# Patient Record
Sex: FEMALE | Race: WHITE | NOT HISPANIC OR LATINO | Employment: FULL TIME | URBAN - METROPOLITAN AREA
[De-identification: names, ages, dates, MRNs, and addresses within clinical notes are randomized per-mention and may not be internally consistent; named-entity substitution may affect disease eponyms.]

---

## 2021-01-18 ENCOUNTER — TELEPHONE (OUTPATIENT)
Dept: BEHAVIORAL HEALTH | Facility: CLINIC | Age: 56
End: 2021-01-18

## 2021-01-18 NOTE — TELEPHONE ENCOUNTER
Patient called intake to schedule dual eval for OP group- Referral created, pt has commercial [employer] plan through employment- info sent to ins team to verify- appt scheduled via [facility] visit with Hansa Girard for 1.20.2021 @ 1030am- [employer] activation sent to patient at time of scheduling

## 2021-01-20 ENCOUNTER — HOSPITAL ENCOUNTER (OUTPATIENT)
Dept: BEHAVIORAL HEALTH | Facility: CLINIC | Age: 56
Discharge: HOME | End: 2021-01-20
Attending: FAMILY MEDICINE | Admitting: FAMILY MEDICINE
Payer: COMMERCIAL

## 2021-01-20 PROCEDURE — H0001 ALCOHOL AND/OR DRUG ASSESS: HCPCS | Mod: 95 | Performed by: COUNSELOR

## 2021-01-20 RX ORDER — HYDROXYZINE HYDROCHLORIDE 25 MG/1
TABLET, FILM COATED ORAL
COMMUNITY

## 2021-01-20 RX ORDER — LACTOBACILLUS RHAMNOSUS GG 15B CELL
CAPSULE, SPRINKLE ORAL
COMMUNITY
Start: 2020-09-14

## 2021-01-20 RX ORDER — OMEPRAZOLE 40 MG/1
CAPSULE, DELAYED RELEASE ORAL
COMMUNITY
Start: 2020-06-04

## 2021-01-20 RX ORDER — ESCITALOPRAM OXALATE 10 MG/1
10 TABLET ORAL DAILY
COMMUNITY
Start: 2020-12-21

## 2021-01-20 RX ORDER — AMITRIPTYLINE HYDROCHLORIDE 75 MG/1
75 TABLET ORAL AT BEDTIME
COMMUNITY
Start: 2020-12-21

## 2021-01-20 ASSESSMENT — COLUMBIA-SUICIDE SEVERITY RATING SCALE - C-SSRS
TOTAL  NUMBER OF ABORTED OR SELF INTERRUPTED ATTEMPTS PAST LIFETIME: NO
6. HAVE YOU EVER DONE ANYTHING, STARTED TO DO ANYTHING, OR PREPARED TO DO ANYTHING TO END YOUR LIFE?: NO
6. HAVE YOU EVER DONE ANYTHING, STARTED TO DO ANYTHING, OR PREPARED TO DO ANYTHING TO END YOUR LIFE?: NO
1. IN THE PAST MONTH, HAVE YOU WISHED YOU WERE DEAD OR WISHED YOU COULD GO TO SLEEP AND NOT WAKE UP?: NO
1. IN THE PAST MONTH, HAVE YOU WISHED YOU WERE DEAD OR WISHED YOU COULD GO TO SLEEP AND NOT WAKE UP?: NO
ATTEMPT PAST THREE MONTHS: NO
TOTAL  NUMBER OF INTERRUPTED ATTEMPTS LIFETIME: NO
ATTEMPT LIFETIME: NO
TOTAL  NUMBER OF ABORTED OR SELF INTERRUPTED ATTEMPTS PAST 3 MONTHS: NO
TOTAL  NUMBER OF INTERRUPTED ATTEMPTS PAST 3 MONTHS: NO

## 2021-01-20 NOTE — PATIENT INSTRUCTIONS
Lily,   It was a pleasure meeting with you today. The next step is to contact MIRI Crockett at [phone] to request scheduling a start date with him. Again the program is virtual M,T,W,Th from 5:30-7:30pm. It is primarily group based. If you have any other questions please feel free to ask Asif or you can call me.    Hansa Echols [facility], [facility]  Licensed Psychotherapist [facility] [email or web address]  Office: [phone] Fax: [phone]  Gender pronouns: she/her/hers
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
## 2021-01-20 NOTE — TELEPHONE ENCOUNTER
----- Message from IMRI Richardson sent at 1/20/2021 12:37 PM CST -----  Regarding: Scheduling  PLEASE SCHEDULE: GERSON MUHAMMAD [[id number]]  FOR SERVICE INITIATION APPOINTMENT    IN [place] On:1//25/21  Visit Type : Video  GROUP THERAPY VIDEO VISIT [[id number]]  with MIRI Richardson @  315PM      PLEASE SCHEDULE:GERSON MUHAMMAD [[id number]]  For 24 sessions  START DATE:1/25/21  MIRI Richardson  Phase 1 Mixed in [place]  [id number] ON MON, WED, THUR 5:30 PM to 7:30 PM and TUES 5:30 PM to 8:30 PM  Visit Type : Video  GROUP THERAPY VIDEO VISIT [[id number]]    ThanksAsif

## 2021-01-20 NOTE — TELEPHONE ENCOUNTER
"SELINA  Name:   Lily Caldwell   YOB: 1965 Age:  55 year old Gender:  female   Referral Source: Employer   Referral DEEPAK: No   Insurance: The [facility] Department is responsible for obtaining ALL authorizations for treatment services at the EOP, DOP and LP levels of care.    Financial Screening: Financial approval from [place]'s business office is NOT NEEDED.     Precipitating Event: Treatment due to pressure from employer and Treatment to maintain employment/school enrollment     DOC: Marijuana    Additional abused substances: None     Medical: headaches     Mental Health: Anxiety     Prior Detox admissions: 1 prior IP detoxification admission(s).    Prior CD treatments: 1 prior CD treatment(s).     Psychosocial history:    and Single, in no serious relationship    2 adult child(josiah)    Stable housing and no concerns    Minimal support network, Relationship conflict with family members or friends due to substance abuse, History of legal issues, Currently employed, At risk of losing job and Financial problems     [place] Suicide Risk Status:  Past month: 0. - Very Low Risk:  Evaluation Counselors:  Document in Epic / [employer] to counselor \"Very Low Risk\".      Treatment Counselors:  Reassess upon admission as applicable, assess weekly in progress notes under Dimension 3 and summarize in Discharge / Treatment summary under Dimension 3.    Past 24 hours: 0. - Very Low Risk:  Evaluation Counselors:  Document in Epic / [employer] to counselor \"Very Low Risk\".      Treatment Counselors:  Reassess upon admission as applicable, assess weekly in progress notes under Dimension 3 and summarize in Discharge / Treatment summary under Dimension 3.     Additional Info as needed: There was no additional information to provide at this time.       "

## 2021-01-25 ENCOUNTER — HOSPITAL ENCOUNTER (OUTPATIENT)
Dept: BEHAVIORAL HEALTH | Facility: CLINIC | Age: 56
End: 2021-01-25
Attending: FAMILY MEDICINE
Payer: COMMERCIAL

## 2021-01-25 PROCEDURE — H2035 A/D TX PROGRAM, PER HOUR: HCPCS | Mod: 95

## 2021-01-25 PROCEDURE — H2035 A/D TX PROGRAM, PER HOUR: HCPCS | Mod: HQ,GT

## 2021-01-25 NOTE — PROGRESS NOTES
Patient:  Lily Caldwell    Date: January 25, 2021    Comprehensive Assessment UPDATE        Comprehensive Summary Update and Review  Counselor met with patient on 1/25/2021 and reviewed the Comprehensive Assessment.    The following updates have been made: Last use date changed from 1/20/21 to 1.3.21.        [place] Suicide Severity Rating Reassessment    Have you ever wished you were dead or that you could go to sleep and not wake up? Past Month?  NO       Have you actually had any thoughts of killing yourself?   Past Month? NO    Have you been thinking about how you might do this?   Past Month?  N/A  Lifetime?   N/A     Have you had these thoughts and had some intention of acting on them?   Past Month?  N/A  Lifetime?   N/A     Have you started to work out the details of how to kill yourself?  Past Month?  N/A  Lifetime?   N/A     Do you intend to carry out this plan?   N/A     When you have the thoughts how long do they last?   N/A    Are there things - anyone or anything (ie Family, Anglican, pain of death) that stopped you from wanting to die or acting on thoughts of suicide?   Does not apply        2008  [employer]  Used with permission by Ceci Robbins, PhD.

## 2021-01-25 NOTE — PROGRESS NOTES
Initial Services Plan        Service Initiation Date: 1/25/2021    Immediate health and/or safety concerns: Yes    Identify health and safety concern(s) below and include plan to address:    Other: Clt reports headaches     Treatment suggestions for client during the time between intake (admit date) and completion of the individual treatment plan:     Introduce yourself to your treatment group. Spend time getting to know your peers    Completed by: MIRI Richardson  Date completed: 1/25/2021 at 3:18 PM

## 2021-01-25 NOTE — GROUP NOTE
"Group Therapy Documentation    PATIENT'S NAME: Lily aCldwell  MRN:   [id number]  :   1965  ACCT. NUMBER: [id number]  DATE OF SERVICE: 21  START TIME:  5:30 PM  END TIME:  7:30 PM  FACILITATOR(S): Chase Wells LADC  TOPIC: BEH Group Therapy  Number of patients attending the group: 11  Group Length:  2 Hours    Group Therapy Type: Addiction    Summary of Group / Topics Discussed:    Choices in Recovery    Telemedicine Visit: The patient's condition can be safely assessed and treated via synchronous audio and visual telemedicine encounter.      Reason for Telemedicine Visit: Services only offered telehealth    Originating Site (Patient Location): Patient's home    Distant Site (Provider Location): Provider Remote Setting    Consent:  The patient/guardian has verbally consented to: the potential risks and benefits of telemedicine (video visit) versus in person care; bill my insurance or make self-payment for services provided; and responsibility for payment of non-covered services.     Mode of Communication:  Video Conference via [employer]    As the provider I attest to compliance with applicable laws and regulations related to telemedicine.       Group Attendance:  Attended group session    Patient's response to the group topic/interactions:  cooperative with task    Patient appeared to be Attentive.        Client specific details:  Client participated with the check-in process and reported no change in sobriety date.  This was Clt's first group session. She introduced herself to group peers. After a reading that talked about \"hate\" she shared her experience with things like hate that just rent space in our heads and keep us focused on negativity.. . This relates to clients Dim 5 treatment plan goal of Develop sober coping and living skills in order to address the development of a strategy for long term recovery   "

## 2021-01-26 ENCOUNTER — HOSPITAL ENCOUNTER (OUTPATIENT)
Dept: BEHAVIORAL HEALTH | Facility: CLINIC | Age: 56
End: 2021-01-26
Attending: FAMILY MEDICINE
Payer: COMMERCIAL

## 2021-01-26 PROCEDURE — H2035 A/D TX PROGRAM, PER HOUR: HCPCS | Mod: HQ,95 | Performed by: SOCIAL WORKER

## 2021-01-27 ENCOUNTER — HOSPITAL ENCOUNTER (OUTPATIENT)
Dept: BEHAVIORAL HEALTH | Facility: CLINIC | Age: 56
End: 2021-01-27
Attending: FAMILY MEDICINE
Payer: COMMERCIAL

## 2021-01-27 PROCEDURE — H2035 A/D TX PROGRAM, PER HOUR: HCPCS | Mod: HQ,95

## 2021-01-27 NOTE — GROUP NOTE
Group Therapy Documentation    PATIENT'S NAME: Lily Caldwell  MRN:   [id number]  :   1965  [id number]  DATE OF SERVICE: 21  START TIME:  5:30 PM  END TIME:  8:30 PM  FACILITATOR(S): Chloe Kuhn LICSW  TOPIC: BEH Group Therapy  Number of patients attending the group:  10  Group Length:  3 Hours    Group Therapy Type: Addiction    Summary of Group / Topics Discussed:    Psychoeducation/Skills Values, Connection and Committee Action (IOP)  Clients learn key concepts of traditional CBT and build on these by learning three core concepts of third wave therapies (Values, Committed Action, and Self-as-Context) and how to apply these in recovery.     Objective(s):    Identify 2 reasons it is important to re-identify and prioritize values that guide behaviors.    Identify 2 ways that the escalation of  addiction may change previous values-based behaviors in negative ways    Identify 3 positive impacts of making connections socially, environmentally, and within the community     Structure (modalities, homework, worksheets, etc.):    Group activity-mapping psychological flexibility     Excerpts from documentary on importance of connections    Group activity-brainstorming universal and personal values     Values worksheet with Value, Goal, Step to take this week    Expected therapeutic outcome(s):     Reinstatement of values that may have been overlooked in active addiction    Reduction of guilt and remorse     Increased satisfaction in relationships and connections    Improved mood and sense of belonging     Therapeutic outcome(s) measured by:   Teachback and Group Review and Evaluation form.    Telemedicine Visit: The patient's condition can be safely assessed and treated via synchronous audio and visual telemedicine encounter.      Reason for Telemedicine Visit: Services only offered telehealth     Originating Site (Patient Location): Patient's home     Distant Site (Provider Location):  "Provider Remote Setting     Consent:  The patient/guardian has verbally consented to: the potential risks and benefits of telemedicine (video visit) versus in person care; bill my insurance or make self-payment for services provided; and responsibility for payment of non-covered services.      Mode of Communication:  Video Conference via [employer]     As the provider I attest to compliance with applicable laws and regulations related to telemedicine.     Group Attendance:  Attended group session    Patient's response to the group topic/interactions:  discussed personal experience with topic, expressed readiness to alter behaviors and expressed understanding of topic    Patient appeared to be Attentive and Engaged.        Client specific details:  This was Lily's first session with this therapist and she shared with group what brought her to treatment. Her drug of choice is \"Meth, but I've not used for 25 years\" but the reason she is in treatment is due to her using marijuana which she agrees I a problem in her life. Her last use date is 1/03/21. Mindfulness: she is unfamiliar with concept but uses deep breathing to calm down; Stress: \"none right now\"; Cravings: \"haven't had any, maybe one in the last week.\" The value to work on this week is \"Non-judgemental\" and the said her daughter-in-law has a 6-month old and a 3 year old and leaves the house dirty. Her step this week is to \"Ask her what I can do to help her gt things done.\" This topic completes one of the six required MH Skills Groups required in D3 of her Treatment Plan.     "

## 2021-01-27 NOTE — GROUP NOTE
"Group Therapy Documentation    PATIENT'S NAME: Lily Caldwell  MRN:   [id number]  :   1965  ACCT. NUMBER: [id number]  DATE OF SERVICE: 21  START TIME:  5:30 PM  END TIME:  7:30 PM  FACILITATOR(S): Chase Wells LADC  TOPIC: BEH Group Therapy  Number of patients attending the group:  10  Group Length:  2 Hours    Group Therapy Type: Addiction    Summary of Group / Topics Discussed:    Choices in Recovery    Group had AA speaker Luis CRAFT Provide overview/introdiction to AA and how it can be helpful for people seeking recovery  Group watched a portion Of video \"Jorge Alberto To Self\" that out lines structure needed for success in recovery    Telemedicine Visit: The patient's condition can be safely assessed and treated via synchronous audio and visual telemedicine encounter.      Reason for Telemedicine Visit: Services only offered telehealth    Originating Site (Patient Location): Patient's home    Distant Site (Provider Location): Provider Remote Setting    Consent:  The patient/guardian has verbally consented to: the potential risks and benefits of telemedicine (video visit) versus in person care; bill my insurance or make self-payment for services provided; and responsibility for payment of non-covered services.     Mode of Communication:  Video Conference via [employer]    As the provider I attest to compliance with applicable laws and regulations related to telemedicine.       Group Attendance:  Attended group session    Patient's response to the group topic/interactions:  cooperative with task    Patient appeared to be Engaged.        Client specific details:  Client reported no change in sobriety date.  Client expressed feeling \"lost\" trying to get into a rhythm with the zoom video. She asked the speaker recovery questions dealing with her inability to stay sober. She shared that she dug out her old BB and plans to start reading it.   A portion of the video  Jorge Alberto to Self:  Protecting Sobriety with the " Science of Safety  was presented.  This session relates to client s Dimension 5 goal to develop sober coping and living skills in order to address the development of a strategy for long term recovery.  .

## 2021-01-28 ENCOUNTER — HOSPITAL ENCOUNTER (OUTPATIENT)
Dept: BEHAVIORAL HEALTH | Facility: CLINIC | Age: 56
End: 2021-01-28
Attending: FAMILY MEDICINE
Payer: COMMERCIAL

## 2021-01-28 PROCEDURE — H2035 A/D TX PROGRAM, PER HOUR: HCPCS | Mod: HQ,GT

## 2021-01-28 NOTE — TELEPHONE ENCOUNTER
----- Message from MIRI Richardson sent at 1/27/2021  5:24 PM CST -----  Regarding: schedule 1:1  Please schedule: GERSON MUHAMMAD [[id number]]  For 1:1 with Asif Wells in [place] for date: 2/2/21 at 200pm  Duration 1 hour  Visit Type:  VIDEO VISIT  Asif Corea

## 2021-01-28 NOTE — GROUP NOTE
Group Therapy Documentation    PATIENT'S NAME: Lily Caldwell  MRN:   [id number]  :   1965  ACCT. NUMBER: [id number]  DATE OF SERVICE: 21  START TIME:  5:30 PM  END TIME:  7:30 PM  FACILITATOR(S): Chase Wells LADC  TOPIC: BEH Group Therapy  Number of patients attending the group:  12  Group Length:  2 Hours    Group Therapy Type: Life skill(s)    Summary of Group / Topics Discussed:    Coping Skills/Lifestyle Managemet    Telemedicine Visit: The patient's condition can be safely assessed and treated via synchronous audio and visual telemedicine encounter.      Reason for Telemedicine Visit: Services only offered telehealth    Originating Site (Patient Location): Patient's home    Distant Site (Provider Location): Provider Remote Setting    Consent:  The patient/guardian has verbally consented to: the potential risks and benefits of telemedicine (video visit) versus in person care; bill my insurance or make self-payment for services provided; and responsibility for payment of non-covered services.     Mode of Communication:  Video Conference via [employer]    As the provider I attest to compliance with applicable laws and regulations related to telemedicine.        Group Attendance:  Attended group session    Patient's response to the group topic/interactions:  cooperative with task    Patient appeared to be Attentive.        Client specific details:  Clt said she wanted to connect with peers over the weekend for support. She said she was feeling frustrated about zoom. She shared a personal strength of being honest..Client specific details:  Client reported no change in sobriety date.   A portion of the video  Jorge Alberto to Self:  Protecting Sobriety with the Science of Safety  was presented.  This session relates to client s Dimension 5 goal to develop sober coping and living skills in order to address the development of a strategy for long term recovery.

## 2021-02-02 ENCOUNTER — HOSPITAL ENCOUNTER (OUTPATIENT)
Dept: BEHAVIORAL HEALTH | Facility: CLINIC | Age: 56
End: 2021-02-02
Attending: FAMILY MEDICINE
Payer: COMMERCIAL

## 2021-02-02 PROCEDURE — H2035 A/D TX PROGRAM, PER HOUR: HCPCS | Mod: HQ,GT | Performed by: SOCIAL WORKER

## 2021-02-03 ENCOUNTER — HOSPITAL ENCOUNTER (OUTPATIENT)
Dept: BEHAVIORAL HEALTH | Facility: CLINIC | Age: 56
End: 2021-02-03
Attending: FAMILY MEDICINE
Payer: COMMERCIAL

## 2021-02-03 PROCEDURE — H2035 A/D TX PROGRAM, PER HOUR: HCPCS | Mod: HQ,GT

## 2021-02-03 NOTE — GROUP NOTE
Group Therapy Documentation    PATIENT'S NAME: Lily Caldwell  MRN:   [id number]  :   1965  ACCT. NUMBER: [id number]  DATE OF SERVICE: 21  START TIME:  5:30 PM  END TIME:  8:30 PM  FACILITATOR(S): Chloe Kuhn LICSW  TOPIC: BEH Group Therapy  Number of patients attending the group:  11  Group Length:  3 Hours    Group Therapy Type: Addiction    Summary of Group / Topics Discussed:    Psychoeducation/Skills Self-Care and Humor in Recovery (IOP)  Learn how powerful self-care is in healing from addiction and building resiliency in mental and physical health.  Learn many aspects of self-care linked to elevating moods, increasing energy reserve and staminal, thinking clearer, focusing better on tasks and improving organizational skills.     Objective(s):    Name 3 reasons self-care is crucial for optimal health and recovery from illness.      Identify 2 mental and physical illnesses directly linked to poor self-care.    Identify 3 ways social connections build resilience and may strengthen our immune systems.    Create a personal plan of action to add to daily schedule of structure and routine.     Structure (modalities, homework, worksheets, etc.):    Self-Care quiz (worksheet)    Education on Self-Care with emphasis on Recovery (PowerPoint)    Why Self-care is a necessity, not a luxury (discussion/white board)    Worksheet to explore and prioritize needs    Action plan to start this week    Expected therapeutic outcome(s):     Be more proactive in their mental and physical health     Start their personal self-care plan this week and note results in journal    Take an inventory of choices and behaviors that hinder their health and have a negative effect on the quality of their lives.      Work to remove the obstacles that keep them from these aspects of self-care.     Therapeutic outcome(s) measured by:   Teachback, creation of personal action plan, and group review and evaluation    Telemedicine  "Visit: The patient's condition can be safely assessed and treated via synchronous audio and visual telemedicine encounter.      Reason for Telemedicine Visit: Services only offered telehealth     Originating Site (Patient Location): Patient's home     Distant Site (Provider Location): Provider Remote Setting     Consent:  The patient/guardian has verbally consented to: the potential risks and benefits of telemedicine (video visit) versus in person care; bill my insurance or make self-payment for services provided; and responsibility for payment of non-covered services.      Mode of Communication:  Video Conference via [employer]     As the provider I attest to compliance with applicable laws and regulations related to telemedicine.     Group Attendance:  Attended group session    Patient's response to the group topic/interactions:  discussed personal experience with topic, expressed readiness to alter behaviors and expressed understanding of topic    Patient appeared to be Actively participating.        Client specific details:  Lily shared that she is experiencing some \"short-term memory loss\" which may be due to PAWS. Her stress was at 10 earlier due to work issues but was down to 6-7 during group. She said this was due to all the changes in her life since. She just returned home from [place] yesterday and said she had no cravings on her trip which surprised her. Her self-care this week will be \"to cut my caffeine intake by going back to drinking water.\" This topic completes one of the six required MH Skills Groups required in D3 of her Treatment Plan.     "

## 2021-02-03 NOTE — GROUP NOTE
Group Therapy Documentation    PATIENT'S NAME: Lily Caldwell  MRN:   [id number]  :   1965  ACCT. NUMBER: [id number]  DATE OF SERVICE: 21  START TIME:  5:30 PM  END TIME:  7:30 PM  FACILITATOR(S): Chase Wells LADC  TOPIC: BEH Group Therapy  Number of patients attending the group:    Group Length:  2 Hours    Group Therapy Type: Addiction    Summary of Group / Topics Discussed:    Values Exploration    Telemedicine Visit: The patient's condition can be safely assessed and treated via synchronous audio and visual telemedicine encounter.      Reason for Telemedicine Visit: Services only offered telehealth    Originating Site (Patient Location): Patient's home    Distant Site (Provider Location): Provider Remote Setting    Consent:  The patient/guardian has verbally consented to: the potential risks and benefits of telemedicine (video visit) versus in person care; bill my insurance or make self-payment for services provided; and responsibility for payment of non-covered services.     Mode of Communication:  Video Conference via [employer]    As the provider I attest to compliance with applicable laws and regulations related to telemedicine.       Group Attendance:  Attended group session    Patient's response to the group topic/interactions:  cooperative with task    Patient appeared to be Engaged.        Client specific details:  Client reported no change in sobriety date.   CLt said she was feeling skeptical tonight. Her personal strength was  Being strong in tough times. She shared a core value of Honesty..This relates to clients Dim 4 treatment plan goal of understanding the impact your substance use has had on you, your family, and significant relationships.

## 2021-02-03 NOTE — PROGRESS NOTES
[facility] Weekly Treatment Plan Review      ATTENDANCE for the following date span:  Monday, 2/2/21thru Thursday, 2/4/21.    Date Monday Tuesday Wednesday Thursday    Group Therapy 0 hours 3 hours 2.0 hours 2.0 hours   Individual Therapy       Family Therapy       Other (Specify)           Patient did have any absences during this time period (list absence dates and reason for absence).  Out of town      Weekly Treatment Plan Review     Treatment Plan initiated on: 1/27/21    Dimension1: Acute Intoxication/Withdrawal Potential -   Date of Last Use 1/20/21  Any reports of withdrawal symptoms - No        Dimension 2: Biomedical Conditions & Complications -   Medical Concerns:  NA  Current Medications & Medication Changes:  Current Outpatient Medications   Medication     amitriptyline (ELAVIL) 75 MG tablet     escitalopram (LEXAPRO) 10 MG tablet     hydrOXYzine (ATARAX) 25 MG tablet     Lactobacillus Rhamnosus, GG, ([facility]) capsule     omeprazole (PRILOSEC) 40 MG DR capsule     No current facility-administered medications for this encounter.      Medication Prescriber:  PCP  Taking meds as prescribed? Yes  Medication side effects or concerns:  NA  Outside medical appointments this week (list provider and reason for visit):  NA        Dimension 3: Emotional/Behavioral Conditions & Complications -   Mental health diagnosis NA    Date of last SIB:  NA  Date of  last SI:  NA  Date of last HI: NA  Behavioral Targets:    Current MH Assignments:      Narrative:     Has impulse control and coping skills. Functions adequately in significant life areas.       Dimension 4: Treatment Acceptance / Resistance -   DARIEL Diagnosis:304.30 (F12.20) Cannabis Use Disorder Moderate   Stage - 1  Commitment to tx process/Stage of change- Preparation  DARIEL assignments -NA    Narrative -    Displays verbal compliance, but lacks consistent behaviors; has low motivation for change; is passively involved in Tx.        Dimension 5: Relapse / Continued Problem Potential -   Relapses this week - None  Urges to use - None  UA results -Clt is submitting UA weekly for work    Narrative-    Poor recognition and understanding of relapse and recidivism issues and displays moderately high vulnerability for further substance use . Has few coping skills, rarely applied.         Dimension 6: Recovery Environment -   Family Involvement - Patient's current living/housing situation involves staying in own home/apartment.  They live with my kids, son and daughter in law and their two children live with me and they report that housing is stable. Patient identified no one as part of their support system.    Summarize attendance at family groups and family sessions - NA  Family supportive of treatment?  Yes    Community support group attendance - NA  Recreational activities - None reported    Narrative -    Engaged in structured, meaningful activity,        Justification for Continued Treatment at this Level of Care:   Poor recognition and understanding of relapse and recidivism issues and displays moderately high vulnerability for further substance use . Has few coping skills, rarely applied.    Discharge Planning:  Target Discharge Date/Timeframe:  6/7/21   Med Mgmt Provider/Appt:  EMILY   Ind therapy Provider/Appt:  EMILY   Family therapy Provider/Appt:  EMILY           Dimension Scale Review     Prior ratings: Dim1 - 0 DIM2 - 1 DIM3 - 1 DIM4 - 2 DIM5 - 3 DIM6 -2     Current ratings: Dim1 - 0 DIM2 - 1 DIM3 - 1 DIM4 - 2 DIM5 - 3 DIM6 -2       Has vulnerable adult status change? No    Are Treatment Plan goals/objectives effective? Yes  *If no, list changes to treatment plan:    Are the current goals meeting client's needs? Yes  *If no, list the changes to treatment plan.    Client Input / Response: NA    Individual Session Start time:  NA  Individual Session Stop Time:  NA    *Client agrees with any changes to the treatment plan: N/A  *Client received  copy of changes: N/A  *Client is aware of right to access a treatment plan review: Yes

## 2021-02-04 ENCOUNTER — HOSPITAL ENCOUNTER (OUTPATIENT)
Dept: BEHAVIORAL HEALTH | Facility: CLINIC | Age: 56
End: 2021-02-04
Attending: FAMILY MEDICINE
Payer: COMMERCIAL

## 2021-02-04 PROCEDURE — H2035 A/D TX PROGRAM, PER HOUR: HCPCS | Mod: HQ,GT

## 2021-02-04 NOTE — GROUP NOTE
"Group Therapy Documentation    PATIENT'S NAME: Lily Caldwell  MRN:   [id number]  :   1965  ACCT. NUMBER: [id number]  DATE OF SERVICE: 21  START TIME:  5:30 PM  END TIME:  7:30 PM  FACILITATOR(S): Chase Wells LADC  TOPIC: BEH Group Therapy  Number of patients attending the group:  10  Group Length:  2 Hours    Group Therapy Type: Life skill(s)    Summary of Group / Topics Discussed:    Boundaries    Telemedicine Visit: The patient's condition can be safely assessed and treated via synchronous audio and visual telemedicine encounter.      Reason for Telemedicine Visit: Services only offered telehealth    Originating Site (Patient Location): Patient's home    Distant Site (Provider Location): Provider Remote Setting    Consent:  The patient/guardian has verbally consented to: the potential risks and benefits of telemedicine (video visit) versus in person care; bill my insurance or make self-payment for services provided; and responsibility for payment of non-covered services.     Mode of Communication:  Video Conference via [employer]    As the provider I attest to compliance with applicable laws and regulations related to telemedicine.       Group Attendance:  Attended group session    Patient's response to the group topic/interactions:  cooperative with task    Patient appeared to be Attentive.        Client specific details:  Client reviewed and evaluated their protective factors and how they might improve them. explored and identified personal boundaries needed to support their recovery. Clt shared that a protective factor for her has been \"Sense of Purpose\" from her job. Now that she is off work after testing positive and being monitored she really knows what she'll be facing if she were to lose her job permanently.  This related to Client Dim 3 goal: Learn how to apply self-care and psychotherapy to build a repertoire of daily habits that counteract PAWS, fatigue, depression and anxiety " leading to increased resiliency and well-being.

## 2021-02-08 ENCOUNTER — HOSPITAL ENCOUNTER (OUTPATIENT)
Dept: BEHAVIORAL HEALTH | Facility: CLINIC | Age: 56
End: 2021-02-08
Attending: FAMILY MEDICINE
Payer: COMMERCIAL

## 2021-02-08 PROCEDURE — H2035 A/D TX PROGRAM, PER HOUR: HCPCS | Mod: HQ,GT

## 2021-02-08 NOTE — PROGRESS NOTES
Comprehensive Assessment Summary     Based on client interview, review of previous assessments and   comprehensive assessment interview the following diagnosis and recommendations are:     Patient: Lily Caldwell  MRN; [id number]   : 1965  Age: 55 year old Sex: female       Client meets criteria for:  304.30 (F12.20) Cannabis Use Disorder Moderate     Dimension One: Acute Intoxication/Withdrawal Potential     Ratin  (Consider the client's ability to cope with withdrawal symptoms and current state of intoxication)      No signs or symptoms of intoxication or withdrawa      Dimension Two: Biomedical Condition and Complications    Ratin  (Consider the degree to which any physical disorder would interfere with treatment for substance abuse, and the client's ability to tolerate any related discomfort; determine the impact of continued chemical use on the unborn child if the client is pregnant)      Tolerates and alberto with physical discomfort and is able to get the services that s/he needs.     Dimension Three: Emotional/Behavioral/Cognitive Conditions & Complications  Ratin  (Determine the degree to which any condition or complications are likely to interfere with treatment for substance abuse or with functioning in significant life areas and the likelihood of risk of harm to self or others)   Has impulse control and coping skills. Functions adequately in significant life areas.    Dimension Four: Treatment Acceptance/Resistance     Ratin  (Consider the amount of support and encouragement necessary to keep the client involved in treatment)      Displays verbal compliance, but lacks consistent behaviors;      Dimension Five: Continued Use/Relaspe Prevention     Rating:  3  (Consider the degree to which the client's recognizes relapse issues and has the skills to prevent relapse of either substance use or mental health problems)    Poor recognition and understanding of relapse and recidivism  issues and displays moderately high vulnerability for further substance use . Has few coping skills, rarely applied. Reports not working while in tx.    Dimension Six: Recovery Environment     Ratin  (Consider the degree to which key areas of the client's life are supportive of or antagonistic to treatment participation and recovery)      Engaged in structured, meaningful activity. Reports Reports son and daughter in law and their two children live with her and they are not sober.           I have reviewed the information on the assessment, psychosocial and medical history and checklist:        it is current

## 2021-02-08 NOTE — GROUP NOTE
"Group Therapy Documentation    PATIENT'S NAME: Lily Caldwell  MRN:   [id number]  :   1965  ACCT. NUMBER: [id number]  DATE OF SERVICE: 21  START TIME:  5:30 PM  END TIME:  7:30 PM  FACILITATOR(S): Chase Wells LADC  TOPIC: BEH Group Therapy  Number of patients attending the group: 11  Group Length:  2 Hours    Group Therapy Type: Addiction    Summary of Group / Topics Discussed:    Complete Values and  Goal Setting  Client treatment plan assignment presentations    Telemedicine Visit: The patient's condition can be safely assessed and treated via synchronous audio and visual telemedicine encounter.      Reason for Telemedicine Visit: Services only offered telehealth    Originating Site (Patient Location): Patient's home    Distant Site (Provider Location): Provider Remote Setting    Consent:  The patient/guardian has verbally consented to: the potential risks and benefits of telemedicine (video visit) versus in person care; bill my insurance or make self-payment for services provided; and responsibility for payment of non-covered services.     Mode of Communication:  Video Conference via [employer]    As the provider I attest to compliance with applicable laws and regulations related to telemedicine.        Group Attendance:  Attended group session    Patient's response to the group topic/interactions:  cooperative with task    Patient appeared to be Engaged.        Client specific details:  Client reported no change in sobriety date.  Client expressed feeling \"hopeful \" today.  Reported Sober activities over the weekend of having found a sponsor who is willing to work with her     .  Client listened to peer share symptoms assignment an participate in group process of what drives self-harm and what it is as a powerfully addictive coping skill.  This related to Client Dim 3 goal: Learn how to apply self-care and psychotherapy to build a repertoire of daily habits that counteract PAWS, fatigue, " depression and anxiety leading to increased resiliency and well-being..

## 2021-02-09 ENCOUNTER — HOSPITAL ENCOUNTER (OUTPATIENT)
Dept: BEHAVIORAL HEALTH | Facility: CLINIC | Age: 56
End: 2021-02-09
Attending: FAMILY MEDICINE
Payer: COMMERCIAL

## 2021-02-09 PROCEDURE — H2035 A/D TX PROGRAM, PER HOUR: HCPCS | Mod: HQ,GT | Performed by: SOCIAL WORKER

## 2021-02-10 ENCOUNTER — HOSPITAL ENCOUNTER (OUTPATIENT)
Dept: BEHAVIORAL HEALTH | Facility: CLINIC | Age: 56
End: 2021-02-10
Attending: FAMILY MEDICINE
Payer: COMMERCIAL

## 2021-02-10 PROCEDURE — H2035 A/D TX PROGRAM, PER HOUR: HCPCS | Mod: HQ,GT

## 2021-02-10 NOTE — GROUP NOTE
"Group Therapy Documentation    PATIENT'S NAME: Lily Caldwell  MRN:   [id number]  :   1965  ACCT. NUMBER: [id number]  DATE OF SERVICE: 2/10/21  START TIME:  5:30 PM  END TIME:  7:30 PM  FACILITATOR(S): Chase Wells LADC  TOPIC: BEH Group Therapy  Number of patients attending the group:  12  Group Length:  2 Hours    Group Therapy Type: Addiction    Summary of Group / Topics Discussed:    Post Acute Withdrawal Syndrome    Telemedicine Visit: The patient's condition can be safely assessed and treated via synchronous audio and visual telemedicine encounter.      Reason for Telemedicine Visit: Services only offered telehealth    Originating Site (Patient Location): Patient's home    Distant Site (Provider Location): Provider Remote Setting    Consent:  The patient/guardian has verbally consented to: the potential risks and benefits of telemedicine (video visit) versus in person care; bill my insurance or make self-payment for services provided; and responsibility for payment of non-covered services.     Mode of Communication:  Video Conference via [employer]    As the provider I attest to compliance with applicable laws and regulations related to telemedicine.       Group Attendance:  Attended group session    Patient's response to the group topic/interactions:  cooperative with task    Patient appeared to be Engaged.        Client specific details:  Client participated in the check-in process and reported no change in sobriety date.  Client expressed feeling \" happy \" today. Clt stated anxiety/depression was  0/10  Client participated in guided mediation and responded to a meditative reading with his interpretation of what was meaningful.  Client participated in group discussion of developing goals in recover and shared a goal of  family relationships improving by talking softly and refraning from any yelling, this related to Client Dim 3 goal: Learn how to apply self-care and psychotherapy to build a " repertoire of daily habits that counteract PAWS, fatigue, depression and anxiety leading to increased resiliency and well-being..

## 2021-02-10 NOTE — GROUP NOTE
Group Therapy Documentation    PATIENT'S NAME: Lily Caldwell  MRN:   [id number]  :   1965  ACCT. NUMBER: [id number]  DATE OF SERVICE: 21  START TIME:  5:30 PM  END TIME:  8:30 PM  FACILITATOR(S): Chloe Kuhn LICSW  TOPIC: BEH Group Therapy  Number of patients attending the group:  13  Group Length:  3 Hours    Group Therapy Type: Addiction    Summary of Group / Topics Discussed:    Psychoeducation/Skills Stress Management and Addiction    Objective(s):    Identify 2 ways stress is connected to relapse in addiction    Identify 3 ways stress affects physical and mental health    Identify 3 ways to manage stress    Identify 1 healthy daily routine to commit to starting this week.     Structure (modalities, homework, worksheets, etc.):    Stress Management Handout   o Definition of Stress  o Importance of self-awareness to warning signs of stress  o Stress reduction in recovery  o Skills used to reduce stress and counteract its impact    Demonstration and practice of deep breathing exercise     Demonstration and practice of Mindfulness     Demonstration and practice of Progressive Relaxation exercise     Demonstration of Aromatherapy inhalers    Expected therapeutic outcome(s):     Decreased frequency and intensity of PAWS    Increased stamina and resiliency    Improved ability to function at optimal level     Therapeutic outcome(s) measured by:   Teachback and group review and evaluation  Telemedicine Visit: The patient's condition can be safely assessed and treated via synchronous audio and visual telemedicine encounter.      Reason for Telemedicine Visit: Services only offered telehealth     Originating Site (Patient Location): Patient's home     Distant Site (Provider Location): Provider Remote Setting     Consent:  The patient/guardian has verbally consented to: the potential risks and benefits of telemedicine (video visit) versus in person care; bill my insurance or make self-payment for  "services provided; and responsibility for payment of non-covered services.      Mode of Communication:  Video Conference via [employer]     As the provider I attest to compliance with applicable laws and regulations related to telemedicine.     Group Attendance:  Attended group session    Patient's response to the group topic/interactions:  expressed readiness to alter behaviors and expressed understanding of topic    Patient appeared to be Attentive and Engaged.        Client specific details:  Lily shared that a friend called her this week wanting her to smoke weed with them and she declined \"with no hesitation at all.\" She also got a sponsor and is writing in her journal. She said her stress was at 0 and she also had 0 cravings. The stress reduction skill she likes she described as \"I'm big on 'If it hasn't happened yet, don't stress over it.'\" She wants to use progressive relaxation this week and do more deep breathing. This topic completes one of the six required MH Skills Groups required in D3 of her Treatment Plan.     "

## 2021-02-11 ENCOUNTER — HOSPITAL ENCOUNTER (OUTPATIENT)
Dept: BEHAVIORAL HEALTH | Facility: CLINIC | Age: 56
End: 2021-02-11
Attending: FAMILY MEDICINE
Payer: COMMERCIAL

## 2021-02-11 PROCEDURE — H2035 A/D TX PROGRAM, PER HOUR: HCPCS | Mod: HQ,GT

## 2021-02-11 PROCEDURE — H2035 A/D TX PROGRAM, PER HOUR: HCPCS | Mod: GT

## 2021-02-11 NOTE — TELEPHONE ENCOUNTER
----- Message from MIRI Richardson sent at 2/11/2021 10:24 AM CST -----  Regarding: Schedule  Please schedule: GERSON MUHAMMAD [[id number]]  For 1:1 with Asif Wells in [place] for date:  2/11/21 400pm  Duration 1 hour  Visit Type: Asif Duncan

## 2021-02-11 NOTE — PROGRESS NOTES
"[facility] Weekly Treatment Plan Review      ATTENDANCE for the following date span:  Monday, 2/2/21thru Thursday, 2/4/21.    Date Monday Tuesday Wednesday Thursday    Group Therapy 0 hours 3 hours 2.0 hours 2.0 hours   Individual Therapy       Family Therapy       Other (Specify)           Patient did not have any absences during this time period (list absence dates and reason for absence).        Weekly Treatment Plan Review    Phase 1: 11 sessions attended through 2/11     Treatment Plan initiated on: 1/27/21    Dimension1: Acute Intoxication/Withdrawal Potential -   Date of Last Use 1/20/21  Any reports of withdrawal symptoms - No        Dimension 2: Biomedical Conditions & Complications -   Medical Concerns:  NA  Current Medications & Medication Changes:  Current Outpatient Medications   Medication     amitriptyline (ELAVIL) 75 MG tablet     escitalopram (LEXAPRO) 10 MG tablet     hydrOXYzine (ATARAX) 25 MG tablet     Lactobacillus Rhamnosus, GG, ([facility]) capsule     omeprazole (PRILOSEC) 40 MG DR capsule     No current facility-administered medications for this encounter.      Medication Prescriber:  PCP  Taking meds as prescribed? Yes  Medication side effects or concerns:  NA  Outside medical appointments this week (list provider and reason for visit):  NA        Dimension 3: Emotional/Behavioral Conditions & Complications -   Mental health diagnosis NA    Date of last SIB:  NA  Date of  last SI:  NA  Date of last HI: NA  Behavioral Targets:    Current  Assignments:      Narrative:     Has impulse control and coping skills. Functions adequately in significant life areas. Clt reporst feeling \"happy and hopeful\".       Dimension 4: Treatment Acceptance / Resistance -   DARIEL Diagnosis:304.30 (F12.20) Cannabis Use Disorder Moderate   Stage - 1  Commitment to tx process/Stage of change- Preparation  DARIEL assignments -NA    Narrative -    Motivated with active reinforcement, to " explore Tx and strategies for change      Dimension 5: Relapse / Continued Problem Potential -   Relapses this week - None  Urges to use - None  UA results -Clt is submitting UA weekly for work    Narrative-    Recognizes relapse issues and prevention strategies, but displays some vulnerability for further substance use. Client has developing coping skills being put to use. Reports looking for meetings to attend and developing sober connections. Clt reported thought of using but was able to play the tape forward and practice cognitive diffusion.       Dimension 6: Recovery Environment -   Family Involvement - Patient's current living/housing situation involves staying in own home/apartment.  They live with my kids, son and daughter in law and their two children live with me and they report that housing is stable. Patient identified no one as part of their support system.    Summarize attendance at family groups and family sessions - NA  Family supportive of treatment?  Yes    Community support group attendance - NA  Recreational activities - None reported    Narrative -    Engaged in structured, meaningful activity,        Justification for Continued Treatment at this Level of Care:   Poor recognition and understanding of relapse and recidivism issues and displays moderately high vulnerability for further substance use . Has few coping skills, rarely applied.    Discharge Planning:  Target Discharge Date/Timeframe:  6/7/21   Med Mgmt Provider/Appt:  EMILY   Ind therapy Provider/Appt:  EMILY   Family therapy Provider/Appt:  EMILY           Dimension Scale Review     Prior ratings: Dim1 - 0 DIM2 - 1 DIM3 - 1 DIM4 - 2 DIM5 - 3 DIM6 -2     Current ratings: Dim1 - 0 DIM2 - 1 DIM3 - 1 DIM4 - 1 DIM5 - 1 DIM6 -2       Has vulnerable adult status change? No    Are Treatment Plan goals/objectives effective? Yes  *If no, list changes to treatment plan:    Are the current goals meeting client's needs? Yes  *If no, list the changes to  treatment plan.    Client Input / Response: NA    Individual Session Start time:  NA  Individual Session Stop Time:  NA    *Client agrees with any changes to the treatment plan: N/A  *Client received copy of changes: N/A  *Client is aware of right to access a treatment plan review: Yes

## 2021-02-11 NOTE — GROUP NOTE
Group Therapy Documentation    PATIENT'S NAME: Lily Caldwell  MRN:   [id number]  :   1965  [id number]  DATE OF SERVICE: 21  START TIME:  5:30 PM  END TIME:  7:30 PM  FACILITATOR(S): Chase Wells LADC  TOPIC: BEH Group Therapy  Number of patients attending the group:  10  Group Length:  2 Hours    Group Therapy Type: Addiction    Summary of Group / Topics Discussed:    Post Acute Withdrawal Syndrome    Telemedicine Visit: The patient's condition can be safely assessed and treated via synchronous audio and visual telemedicine encounter.      Reason for Telemedicine Visit: Services only offered telehealth    Originating Site (Patient Location): Patient's home    Distant Site (Provider Location): Provider Remote Setting    Consent:  The patient/guardian has verbally consented to: the potential risks and benefits of telemedicine (video visit) versus in person care; bill my insurance or make self-payment for services provided; and responsibility for payment of non-covered services.     Mode of Communication:  Video Conference via [employer]    As the provider I attest to compliance with applicable laws and regulations related to telemedicine.        Group Attendance:  Attended group session    Patient's response to the group topic/interactions:  expressed reluctance to alter behavior    Patient appeared to be Actively participating.        Client specific details:  Client participated in the check-in process and reported no change in sobriety date.  Client processed a group desire to have a phone list so they could communicate with group peers for sober support outside group time. Client participated in guided mediation and responded to a meditative reading with group process interpretation of what was meaningful. Client participated in group discussion of Post-Acute Withdrawal Syndrome. Clt identified the major types of PAW symptoms generally encountered. Clt shared an activity they  planned for the weekend to support recovery of journaling and working on treatment planning assignment for next week.

## 2021-02-12 NOTE — PROGRESS NOTES
Individual Session to Review Treatment Plan       Duration: 40 minutes        Data: Reviewed clients intial treatment plan goals with client.  Client shared she will identify 2 goals to work on during treatment.    Intervention: Person centered and MI approaches    Assesment: Client is in contemplation stage of change.     Plan: Client will participate virtual sober meetings, make calls to group peers for support and practice self-care.     Telemedicine Visit: The patient's condition can be safely assessed and treated via synchronous audio and visual telemedicine encounter.      Reason for Telemedicine Visit: Services only offered telehealth    Originating Site (Patient Location): Patient's home    Distant Site (Provider Location): Provider Remote Setting    Consent:  The patient/guardian has verbally consented to: the potential risks and benefits of telemedicine (video visit) versus in person care; bill my insurance or make self-payment for services provided; and responsibility for payment of non-covered services.     Mode of Communication:  Video Conference via [employer]    As the provider I attest to compliance with applicable laws and regulations related to telemedicine.

## 2021-02-15 ENCOUNTER — HOSPITAL ENCOUNTER (OUTPATIENT)
Dept: BEHAVIORAL HEALTH | Facility: CLINIC | Age: 56
End: 2021-02-15
Attending: FAMILY MEDICINE
Payer: COMMERCIAL

## 2021-02-15 PROCEDURE — H2035 A/D TX PROGRAM, PER HOUR: HCPCS | Mod: HQ,GT

## 2021-02-16 ENCOUNTER — HOSPITAL ENCOUNTER (OUTPATIENT)
Dept: BEHAVIORAL HEALTH | Facility: CLINIC | Age: 56
End: 2021-02-16
Attending: FAMILY MEDICINE
Payer: COMMERCIAL

## 2021-02-16 PROCEDURE — H2035 A/D TX PROGRAM, PER HOUR: HCPCS | Mod: HQ,GT | Performed by: SOCIAL WORKER

## 2021-02-16 NOTE — GROUP NOTE
"Group Therapy Documentation    PATIENT'S NAME: Lily Caldwell  MRN:   [id number]  :   1965  ACCT. NUMBER: [id number]  DATE OF SERVICE: 2/15/21  START TIME:  5:30 PM  END TIME:  7:30 PM  FACILITATOR(S): Mark Campbell  TOPIC: BEH Group Therapy  Number of patients attending the group:  10  Group Length:  2 Hours    Group Therapy Type: Addiction and Psychotherapeutic    Summary of Group / Topics Discussed:    Communication, Coping Skills/Lifestyle Managemet, and Forgiveness    In today's session clients checked in, reporting no change in date of last use. Client then read a reading from the book, \"Don't Sweat the Small Stuff\" called \"Be the first to act loving or reach out,\" and shared how they related to the reading. The topic of being happy as opposed to being right and how being judgmental and the role of resentments in ongoing use or relapse were discussed as part of this reading and throughout this session. Clients shared on sober coping skills they are using and at the end of the session shared a word of how they feel about their sobriety/recovery today and or a an important share of what is better so far.     Telemedicine Visit: The patient's condition can be safely assessed and treated via synchronous audio and visual telemedicine encounter.      Reason for Telemedicine Visit: Services only offered telehealth     Originating Site (Patient Location): Patient's home     Distant Site (Provider Location): [facility] Outpatient Setting: [place]     Consent:  The patient/guardian has verbally consented to: the potential risks and benefits of telemedicine (video visit) versus in person care; bill my insurance or make self-payment for services provided; and responsibility for payment of non-covered services.      Mode of Communication:  Video Conference via [employer]     As the provider I attest to compliance with applicable laws and regulations related to telemedicine.       Group Attendance:  Attended " "group session    Patient's response to the group topic/interactions:  discussed personal experience with topic    Patient appeared to be Engaged.        Client specific details:  In today's session client checked in, reporting no change in date of last use. Client then read a reading and shared on how she related to a reading on the topic of being happy as opposed to being right, how being judgmental and having resentments played a role in her ongoing use or relapses. She shared that that she went five years at one time not talking to her own son and that she really regrets this, that she did reach out to him at one point, and that she \"totally understands this reading.\" She reports the relationship with her son is fine now, she wants to see him get help for his addiction. Throughout this session client worked on her Dimension four goals of, \"Understand the impact your substance use has had on you, your family, and significant relationships. Increase internal motivation to change.\" At the end of the session client shared a word of how she feels about her sobriety/recovery today, her word is, \"Accepting.\"      Plan: Practice being kind and reaching out first and share if this is helpful for your sobriety/recovery and give an example of how so, in a session next.  "

## 2021-02-17 ENCOUNTER — HOSPITAL ENCOUNTER (OUTPATIENT)
Dept: BEHAVIORAL HEALTH | Facility: CLINIC | Age: 56
End: 2021-02-17
Attending: FAMILY MEDICINE
Payer: COMMERCIAL

## 2021-02-17 PROCEDURE — H2035 A/D TX PROGRAM, PER HOUR: HCPCS | Mod: HQ,GT

## 2021-02-17 NOTE — GROUP NOTE
Group Therapy Documentation    PATIENT'S NAME: Lily Caldwell  MRN:   [id number]  :   1965  [id number]  DATE OF SERVICE: 21  START TIME:  5:30 PM  END TIME:  8:30 PM  FACILITATOR(S): Chloe Kuhn LICSW  TOPIC: BEH Group Therapy  Number of patients attending the group:  13  Group Length:  3 Hours    Group Therapy Type: Addiction    Summary of Group / Topics Discussed:    Psychoeducation/Skills How Neurobiology Affects Behavior (IOP)  This topic will give a general overview of the neurobiology of addiction with the purpose of teaching new brain-based coping strategies to reduce the impact of their cravings, urges and distorted memories.  The clients will learn how the midbrain uses memory and associations to create cravings and how to counteract these.     Objective(s):    Clients will identify 3 ways to calm their overactive midbrain and strengthen their frontal cortex to lessen the impact of cravings and urges for addictive substances.      Clients will identify the underlying mechanisms at work to help them detach from the thoughts and emotions that trigger using.     Describe the reward pathway involved in addiction    Identify 1 skill  to counteract cravings and urges to use substances    Structure (modalities, homework, worksheets, etc.):    History of the study of addiction as a disease (PowerPoint)    Psychoeducation on the areas of the midbrain involved in addiction and how the various parts of the brain communicate with each other (PowerPoint)    Psychoeducation and discussion on how the escalation of addiction manifests in personal behaviors leading to negative consequences     Hands on practice of evidenced based strategies to calm the midbrain     Hands on practice of evidenced based strategies to strengthen the frontal cortex    Psychoeducation of anatomy of cravings, urges and addictive thinking and how to counteract this pattern     Show video clip of cross-addictions &  "process addictions    Expected therapeutic outcome(s):     A reduction in shame and guilt due to understanding how addiction influences behavior.    Reduction of use episodes due to using skills to reduce cravings.    Therapeutic outcome(s) measured by:   Teachback and group review and evaluation form    Telemedicine Visit: The patient's condition can be safely assessed and treated via synchronous audio and visual telemedicine encounter.      Reason for Telemedicine Visit: Services only offered telehealth     Originating Site (Patient Location): Patient's home     Distant Site (Provider Location): Provider Remote Setting     Consent:  The patient/guardian has verbally consented to: the potential risks and benefits of telemedicine (video visit) versus in person care; bill my insurance or make self-payment for services provided; and responsibility for payment of non-covered services.      Mode of Communication:  Video Conference via [employer]     As the provider I attest to compliance with applicable laws and regulations related to telemedicine.    Group Attendance:  Attended group session    Patient's response to the group topic/interactions:  discussed personal experience with topic, expressed readiness to alter behaviors and expressed understanding of topic    Patient appeared to be Actively participating.        Client specific details:  Lily stated that she's been having a lot of bad headaches this week and has an appt with her PCP tomorrow. She is journaling and practicing mindfulness. She found her \"old treatment books\" that the meditations and has started to read these again. She has some stress, a 4, due to these headaches. She has had no cravings this week  And said that some people remind her of using but she is able to counteract these quickly. The most important part of today's topic for her was \"I was being judgemental about talking about someone who is 71 years old and still using. It's the only " "lifestyle he knows, I realize how hard it must be for him now.\" This topic completes one of the six required MH Skills Groups required in D3 of her Treatment Plan.     "

## 2021-02-17 NOTE — GROUP NOTE
Group Therapy Documentation    PATIENT'S NAME: Lily Caldwell  MRN:   [id number]  :   1965  [facility]. NUMBER: [id number]  DATE OF SERVICE: 21  START TIME:  5:30 PM  END TIME:  7:30 PM  FACILITATOR(S): Chase Wells LADC  TOPIC: BEH Group Therapy  Number of patients attending the group:  11  Group Length:  2 Hours    Group Therapy Type: Addiction    Summary of Group / Topics Discussed:    Distress Tolerance    Telemedicine Visit: The patient's condition can be safely assessed and treated via synchronous audio and visual telemedicine encounter.      Reason for Telemedicine Visit: Services only offered telehealth    Originating Site (Patient Location): Patient's home    Distant Site (Provider Location): Provider Remote Setting    Consent:  The patient/guardian has verbally consented to: the potential risks and benefits of telemedicine (video visit) versus in person care; bill my insurance or make self-payment for services provided; and responsibility for payment of non-covered services.     Mode of Communication:  Video Conference via [employer]    As the provider I attest to compliance with applicable laws and regulations related to telemedicine.        Group Attendance:  Attended group session    Patient's response to the group topic/interactions:  cooperative with task    Patient appeared to be Actively participating.        Client specific details:  Client participated in the check-in process and reported no change in sobriety date.  Client reports feeling  Clients participated in introductions where client shared followed by a brief meditation on gratitude and a reading from the book 24 Hours. A short discussion followed each where clients had the opportunity to share thoughts. Client read in group a piece on the topic  developing frustration tolerance  and processed how address the oversized / extreme reactions to normal stressors and inconveniences of everyday life caused by drugs and alcohol  addiction. Clients learned how it is necessary to change their thinking and work through challenges and problems. Client shared she can return to work after a negative UA for TH[employer]. She said she looks forward to having recovered from a hopeless state of mind and is grateful for this group.

## 2021-02-18 ENCOUNTER — HOSPITAL ENCOUNTER (OUTPATIENT)
Dept: BEHAVIORAL HEALTH | Facility: CLINIC | Age: 56
End: 2021-02-18
Attending: FAMILY MEDICINE
Payer: COMMERCIAL

## 2021-02-18 PROCEDURE — H2035 A/D TX PROGRAM, PER HOUR: HCPCS | Mod: HQ,GT

## 2021-02-18 NOTE — GROUP NOTE
"Group Therapy Documentation    PATIENT'S NAME: Lily Caldwell  MRN:   [id number]  :   1965  [facility]. NUMBER: [id number]  DATE OF SERVICE: 21  START TIME:  5:30 PM  END TIME:  7:30 PM  FACILITATOR(S): Chase Wells LADC  TOPIC: BEH Group Therapy  Number of patients attending the group:  10  Group Length:  2 Hours    Group Therapy Type: Life skill(s)    Summary of Group / Topics Discussed:    Distress Tolerance and Acceptance    Telemedicine Visit: The patient's condition can be safely assessed and treated via synchronous audio and visual telemedicine encounter.      Reason for Telemedicine Visit: Services only offered telehealth    Originating Site (Patient Location): Patient's home    Distant Site (Provider Location): Provider Remote Setting    Consent:  The patient/guardian has verbally consented to: the potential risks and benefits of telemedicine (video visit) versus in person care; bill my insurance or make self-payment for services provided; and responsibility for payment of non-covered services.     Mode of Communication:  Video Conference via [employer]    As the provider I attest to compliance with applicable laws and regulations related to telemedicine.        Group Attendance:  Attended group session    Patient's response to the group topic/interactions:  cooperative with task    Patient appeared to be Actively participating.        Client specific details:  Client participated in the check-in process and reported no change in sobriety date.  Client reports anxiety levels of 1/10 and cravings of 0/10. She shared she was feeling \"upbeat\" tonight\".  Client presented her triggers assignment in group and responded to feedback and questions from group peers.  Clients processed how the thoughts and beliefs that set self-centered, self-serving, and reactive attitudes into motion can be changed by  1) List three things that commonly cause you to be more frustrated than you should be. 2) For each item " listed, write a new response you can use in the future. 3) List three ways you can practice maintaining control and increase your frustration tolerance.  Client processed in group how  acceptance  is learning distinguish between those things that I could change and those I could not.   Client shared planned recovery activities for the weekend of practicing mindfulness skills, attending AA and connecting with group peer.

## 2021-02-22 ENCOUNTER — HOSPITAL ENCOUNTER (OUTPATIENT)
Dept: BEHAVIORAL HEALTH | Facility: CLINIC | Age: 56
End: 2021-02-22
Attending: FAMILY MEDICINE
Payer: COMMERCIAL

## 2021-02-22 PROCEDURE — H2035 A/D TX PROGRAM, PER HOUR: HCPCS | Mod: HQ,GT

## 2021-02-22 NOTE — GROUP NOTE
Group Therapy Documentation    PATIENT'S NAME: Lily Caldwell  MRN:   [id number]  :   1965  AC[facility]. NUMBER: [id number]  DATE OF SERVICE: 21  START TIME:  5:30 PM  END TIME:  7:30 PM  FACILITATOR(S): Chase Wells LADC  TOPIC: BEH Group Therapy  Number of patients attending the group:  12  Group Length:  2 Hours    Group Therapy Type: Life skill(s)    Summary of Group / Topics Discussed:    Distress Tolerance and Emotional Regulation    Telemedicine Visit: The patient's condition can be safely assessed and treated via synchronous audio and visual telemedicine encounter.      Reason for Telemedicine Visit: Services only offered telehealth    Originating Site (Patient Location): Patient's home    Distant Site (Provider Location): Provider Remote Setting    Consent:  The patient/guardian has verbally consented to: the potential risks and benefits of telemedicine (video visit) versus in person care; bill my insurance or make self-payment for services provided; and responsibility for payment of non-covered services.     Mode of Communication:  Video Conference via [employer]    As the provider I attest to compliance with applicable laws and regulations related to telemedicine.        Group Attendance:  Attended group session    Patient's response to the group topic/interactions:  cooperative with task    Patient appeared to be Engaged.        Client specific details:  Client participated in the check-in process and reported no change in sobriety date. Client expresses she is feeling anxious and physically ill tonight. This was due to the fact she passed her drug test at work today and will be able to return to work tomorrow. She said she was anxious because she's thinking about all the people that will be asking her questions about where she was. Group processed their support for passing the UA and that she will be surprised at all the support and encouragement she receives. Client also shared she went to  her first NA meeting Sat night which generated cheers from her peers.  .

## 2021-02-23 ENCOUNTER — HOSPITAL ENCOUNTER (OUTPATIENT)
Dept: BEHAVIORAL HEALTH | Facility: CLINIC | Age: 56
End: 2021-02-23
Attending: FAMILY MEDICINE
Payer: COMMERCIAL

## 2021-02-23 PROCEDURE — H2035 A/D TX PROGRAM, PER HOUR: HCPCS | Mod: HQ,GT | Performed by: SOCIAL WORKER

## 2021-02-24 ENCOUNTER — HOSPITAL ENCOUNTER (OUTPATIENT)
Dept: BEHAVIORAL HEALTH | Facility: CLINIC | Age: 56
End: 2021-02-24
Attending: FAMILY MEDICINE
Payer: COMMERCIAL

## 2021-02-24 PROCEDURE — H2035 A/D TX PROGRAM, PER HOUR: HCPCS | Mod: HQ,GT

## 2021-02-24 NOTE — GROUP NOTE
Group Therapy Documentation    PATIENT'S NAME: Lily Caldwell  MRN:   [id number]  :   1965  [id number]  DATE OF SERVICE: 21  START TIME:  5:30 PM  END TIME:  8:30 PM  FACILITATOR(S): Chloe Kuhn LICSW  TOPIC: BEH Group Therapy  Number of patients attending the group:  9  Group Length:  3 Hours    Group Therapy Type: Addiction    Summary of Group / Topics Discussed:    Psychoeducation/Skills Mindfulness in Action (IOP)  Learn what mindfulness is and how to practice it. Learn how to increase awareness of the present moment, thereby reducing ruminating thoughts and learn how to embrace negative emotions instead of suppressing or avoiding them. Consistent practice has been shown to decrease reactivity and help facilitate effective action to make positive changes in behavior.     Objective(s):    Identify 2 skills to increase awareness of the present moment to reduce negative impact of ruminating thoughts    Describe how to embrace negative emotions instead of suppressing or avoiding them.    Give 1 example of how mindfulness works in the brain to calm the Survival part of the Midbrain (which is overactive in addiction)    Give1 example of how mindfulness practice may decrease reactivity and facilitate effective action    Structure (modalities, homework, worksheets, etc.):    Complete  before and after  worksheet for mindfulness practice ([place] Exercise)    Participate in Awareness of Sounds meditation    Participate in Mindful Eating meditation    Complete Mindful Listening exercise with other group member(s) (Dyads or Triads)    Participate in Sitting Meditation     Expected therapeutic outcome(s):     Increased ability to remain in present moment    Increased ability to tolerate negative emotions without returning to addiction    Therapeutic outcome(s) measured by:   Teachback and group review and evaluation form.    Telemedicine Visit: The patient's condition can be safely assessed  "and treated via synchronous audio and visual telemedicine encounter.      Reason for Telemedicine Visit: Services only offered telehealth     Originating Site (Patient Location): Patient's home     Distant Site (Provider Location): Provider Remote Setting     Consent:  The patient/guardian has verbally consented to: the potential risks and benefits of telemedicine (video visit) versus in person care; bill my insurance or make self-payment for services provided; and responsibility for payment of non-covered services.      Mode of Communication:  Video Conference via [email or web address]     As the provider I attest to compliance with applicable laws and regulations related to telemedicine.    Group Attendance:  Attended group session    Patient's response to the group topic/interactions:  discussed personal experience with topic, expressed readiness to alter behaviors and expressed understanding of topic    Patient appeared to be Attentive and Engaged.        Client specific details:  Lily shared that she had passed her drug test to return to work yesterday and this made her very anxious as she was embarrassed to try and explain to co-workers. Her first day back was today and her supervisor was so proud of her and so supportive her fear was gone. She said her anxiety and stress were both zero and she has had no cravings this past week. Her favorite part was mindful listening and she explained, \"It was really easy to keep the flow going when you're really listening. I'm not interrupting, 'what was that?' and asking to repeat things.\" This topic completes one of the six required MH Skills Groups required in D3 of her Treatment Plan.     "

## 2021-02-24 NOTE — GROUP NOTE
Group Therapy Documentation    PATIENT'S NAME: Lily Caldwell  MRN:   [id number]  :   1965  AC[facility]. NUMBER: [id number]  DATE OF SERVICE: 21  START TIME:  5:30 PM  END TIME:  7:30 PM  FACILITATOR(S): Chase Wells LADC  TOPIC: BEH Group Therapy  Number of patients attending the group: 11  Group Length:  2 Hours    Group Therapy Type: Addiction    Summary of Group / Topics Discussed:    Complete Distress Tolerance and Acceptance    Telemedicine Visit: The patient's condition can be safely assessed and treated via synchronous audio and visual telemedicine encounter.      Reason for Telemedicine Visit: Services only offered telehealth    Originating Site (Patient Location): Patient's home    Distant Site (Provider Location): Provider Remote Setting    Consent:  The patient/guardian has verbally consented to: the potential risks and benefits of telemedicine (video visit) versus in person care; bill my insurance or make self-payment for services provided; and responsibility for payment of non-covered services.     Mode of Communication:  Video Conference via [employer]    As the provider I attest to compliance with applicable laws and regulations related to telemedicine.        Group Attendance:  Attended group session    Patient's response to the group topic/interactions:  discussed personal experience with topic    Patient appeared to be Actively participating.        Client specific details:  Client participated in the check-in process and reported no change in sobriety date. Clt shared her 10 worst consequences assignment in group and it was some very tough stuff. The group was extremely supportive of her courage and offered ongoing support after group. 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 She said she is going to practice taking 3 mindful deep breaths when this happens to reduce frustration and allow her to respond with patience rather than  react with anger.

## 2021-02-25 ENCOUNTER — HOSPITAL ENCOUNTER (OUTPATIENT)
Dept: BEHAVIORAL HEALTH | Facility: CLINIC | Age: 56
End: 2021-02-25
Attending: FAMILY MEDICINE
Payer: COMMERCIAL

## 2021-02-25 PROCEDURE — H2035 A/D TX PROGRAM, PER HOUR: HCPCS | Mod: HQ,GT

## 2021-02-25 NOTE — GROUP NOTE
Group Therapy Documentation    PATIENT'S NAME: Lily Caldwell  MRN:   [id number]  :   1965  ACCT. NUMBER: [id number]  DATE OF SERVICE: 21  START TIME:  5:30 PM  END TIME:  7:30 PM  FACILITATOR(S): Chase Wells LADC  TOPIC: BEH Group Therapy  Number of patients attending the group:  10  Group Length:  2 Hours    Group Therapy Type: Life skill(s)    Summary of Group / Topics Discussed:    Emotional Regulation      Group Attendance:  Attended group session    Patient's response to the group topic/interactions:  expressed readiness to alter behaviors    Patient appeared to be Actively participating.        Client specific details:  Client participated in the check-in process and reported no change in sobriety date.  Reported anxiety of 0 /10 and motivation for recovery of 10/10 Client discussed the emotional challenges that can be anticipated in early recovery and the recommended habits/tools/coping skills  that can aid develop better awareness of emotions and their roles. Client shared anger and frustration are emotions that have been in play and reports deep breathing and meditation as being effective coping skills     Clt asked how to find virtual meetings and so group together went to [facility] intergroup site to explore online mtgs.  This related to Client Dim 3 goal: Learn how to apply self-care and psychotherapy to build a repertoire of daily habits that counteract PAWS, fatigue, depression and anxiety leading to increased resiliency and well-being.

## 2021-02-25 NOTE — PROGRESS NOTES
"[facility] Weekly Treatment Plan Review      ATTENDANCE for the following date span:  Monday, 2/2/21thru Thursday, 2/4/21.    Date Monday Tuesday Wednesday Thursday    Group Therapy 0 hours 3 hours 2.0 hours 2.0 hours   Individual Therapy       Family Therapy       Other (Specify)           Patient did not have any absences during this time period (list absence dates and reason for absence).        Weekly Treatment Plan Review    Phase 1: 15 sessions attended through 2/18     Treatment Plan initiated on: 1/27/21    Dimension1: Acute Intoxication/Withdrawal Potential -   Date of Last Use 1/20/21  Any reports of withdrawal symptoms - No        Dimension 2: Biomedical Conditions & Complications -   Medical Concerns:  NA  Current Medications & Medication Changes:  Current Outpatient Medications   Medication     amitriptyline (ELAVIL) 75 MG tablet     escitalopram (LEXAPRO) 10 MG tablet     hydrOXYzine (ATARAX) 25 MG tablet     Lactobacillus Rhamnosus, GG, ([facility]) capsule     omeprazole (PRILOSEC) 40 MG DR capsule     No current facility-administered medications for this encounter.      Medication Prescriber:  PCP  Taking meds as prescribed? Yes  Medication side effects or concerns:  NA  Outside medical appointments this week (list provider and reason for visit):  NA        Dimension 3: Emotional/Behavioral Conditions & Complications -   Mental health diagnosis NA    Date of last SIB:  NA  Date of  last SI:  NA  Date of last HI: NA  Behavioral Targets:    Current  Assignments:      Narrative:     Has impulse control and coping skills. Functions adequately in significant life areas. Clt reporst feeling \"grateful for group peers\".       Dimension 4: Treatment Acceptance / Resistance -   DARIEL Diagnosis:304.30 (F12.20) Cannabis Use Disorder Moderate   Stage - 1  Commitment to tx process/Stage of change- Preparation  DARIEL assignments -NA    Narrative -    Motivated with active reinforcement, " to explore Tx and strategies for change. Client reports making sober connection.     Dimension 5: Relapse / Continued Problem Potential -   Relapses this week - None  Urges to use - None  UA results -Clt is submitting UA weekly for work     Narrative-    Recognizes relapse issues and prevention strategies, but displays some vulnerability for further substance use. Client has developing coping skills being put to use. Reports looking for meetings to attend and developing sober connections. Clt reported thought of using but was able to play the tape forward and practice cognitive diffusion. Client reports return to work on 2/23 and to sharing she is in recovery with her supervisor who was supportive. Client reports attending sober support and looking for additional meetings and sponsor.       Dimension 6: Recovery Environment -   Family Involvement - Patient's current living/housing situation involves staying in own home/apartment.  They live with my kids, son and daughter in law and their two children live with me and they report that housing is stable. Patient identified making sober connection and exploring sober support.      Summarize attendance at family groups and family sessions - NA  Family supportive of treatment?  Yes    Community support group attendance - Client reports attending NA meeting  Recreational activities - None reported    Narrative -    Engaged in structured, meaningful activity, Client reports return to work on 2/23. Reports drug testing for next 2 years for continued employment.       Justification for Continued Treatment at this Level of Care:  Recognizes relapse issues and prevention strategies, but displays some vulnerability for further substance use. Client has developing coping skills being put to use.     Discharge Planning:  Target Discharge Date/Timeframe:  6/7/21   Med Mgmt Provider/Appt:  EMILY   Ind therapy Provider/Appt:  EMILY   Family therapy Provider/Appt:  EMILY           Dimension  Scale Review     Prior ratings: Dim1 - 0 DIM2 - 1 DIM3 - 1 DIM4 - 2 DIM5 - 3 DIM6 -2     Current ratings: Dim1 - 0 DIM2 - 1 DIM3 - 1 DIM4 - 1 DIM5 - 1 DIM6 -1       Has vulnerable adult status change? No    Are Treatment Plan goals/objectives effective? Yes  *If no, list changes to treatment plan:    Are the current goals meeting client's needs? Yes  *If no, list the changes to treatment plan.    Client Input / Response: NA    Individual Session Start time:  NA  Individual Session Stop Time:  NA    *Client agrees with any changes to the treatment plan: N/A  *Client received copy of changes: N/A  *Client is aware of right to access a treatment plan review: Yes

## 2021-03-01 ENCOUNTER — HOSPITAL ENCOUNTER (OUTPATIENT)
Dept: BEHAVIORAL HEALTH | Facility: CLINIC | Age: 56
End: 2021-03-01
Attending: FAMILY MEDICINE
Payer: COMMERCIAL

## 2021-03-01 PROCEDURE — H2035 A/D TX PROGRAM, PER HOUR: HCPCS | Mod: HQ,95

## 2021-03-01 NOTE — GROUP NOTE
"Group Therapy Documentation    PATIENT'S NAME: Lily Caldwell  MRN:   [id number]  :   1965  ACCT. NUMBER: [id number]  DATE OF SERVICE: 3/01/21  START TIME:  5:30 PM  END TIME:  7:30 PM  FACILITATOR(S): Chase Wells LADC  TOPIC: BEH Group Therapy  Number of patients attending the group:  11  Group Length:  2 Hours    Group Therapy Type: Life skill(s)    Summary of Group / Topics Discussed:    Coping Skills/Lifestyle Managemet    Telemedicine Visit: The patient's condition can be safely assessed and treated via synchronous audio and visual telemedicine encounter.      Reason for Telemedicine Visit: Services only offered telehealth    Originating Site (Patient Location): Patient's home    Distant Site (Provider Location): Provider Remote Setting    Consent:  The patient/guardian has verbally consented to: the potential risks and benefits of telemedicine (video visit) versus in person care; bill my insurance or make self-payment for services provided; and responsibility for payment of non-covered services.     Mode of Communication:  Video Conference via [employer]    As the provider I attest to compliance with applicable laws and regulations related to telemedicine.        Group Attendance:  Attended group session    Patient's response to the group topic/interactions:  cooperative with task    Patient appeared to be Actively participating, Attentive and Engaged.        Client specific details:  Client participated in the check-in process and reported no change in sobriety date.  Client reported that she supported her recovery overy the weekend by taking care of her granddaughter for the day by going out to eat and spending time outdoors in the nice weather.  She shared that she is getting stressed out at work but she is using mindful breathing to manage stress. She also is going to reach out to her boss for advice. Client watched Happiness video and Identified and liked \"happiness takes effort to have\"  as " something they could do to support their recovery and specifically to manage negative thought. This relates to DIM 3 t goal of Learn how to apply self-care and psychotherapy to build a repertoire of daily habits that increasedresiliency and well-being.Clt is interested in more information about the Family Group

## 2021-03-02 ENCOUNTER — HOSPITAL ENCOUNTER (OUTPATIENT)
Dept: BEHAVIORAL HEALTH | Facility: CLINIC | Age: 56
End: 2021-03-02
Attending: FAMILY MEDICINE
Payer: COMMERCIAL

## 2021-03-02 PROCEDURE — H2035 A/D TX PROGRAM, PER HOUR: HCPCS | Mod: HQ,95 | Performed by: SOCIAL WORKER

## 2021-03-03 ENCOUNTER — HOSPITAL ENCOUNTER (OUTPATIENT)
Dept: BEHAVIORAL HEALTH | Facility: CLINIC | Age: 56
End: 2021-03-03
Attending: FAMILY MEDICINE
Payer: COMMERCIAL

## 2021-03-03 PROCEDURE — H2035 A/D TX PROGRAM, PER HOUR: HCPCS | Mod: HQ,95

## 2021-03-03 NOTE — GROUP NOTE
Group Therapy Documentation    PATIENT'S NAME: Lily Caldwell  MRN:   [id number]  :   1965  [id number]  DATE OF SERVICE: 3/02/21  START TIME:  5:30 PM  END TIME:  8:30 PM  FACILITATOR(S): Chloe Kuhn LICSW  TOPIC: BEH Group Therapy  Number of patients attending the group:  9  Group Length:  3 Hours    Group Therapy Type: Addiction    Summary of Group / Topics Discussed:    Psychoeducation/Skills Cognitive Defusion and Acceptance (ACT & CBT) IOP  Clients learn key concepts of traditional CBT and build on these by learning three core concepts of third wave therapies (Acceptance, Being Present, Cognitive Defusion) and how to apply these in recovery.     Objective(s):    Increase psychological flexibility  by changing clients  relationships with negative thoughts and emotions and build resilience to cravings and negative moods    Practice 2 skills to detach from negative thoughts and emotions, observe them in action and then choose behaviors that serve clients  personal value system.    Structure (modalities, homework, worksheets, etc):    Psychoeducation on evolution to CBT    Demonstrate and practice in Liquid Mood meditation    Explore Self-Acceptance with positive statements to use each day     Handout on practicing Acceptance    Handout on Cognitive Defusion with thoughts and emotions     Visualizations for cognitive defusion and sitting with emotions    Expected therapeutic outcome(s):     Reduction of ruminating thoughts and enhanced emotional stability    Detachment from negative emotions in order to use these constructively (messengers)     Therapeutic outcome(s) measured by:   Teachback and Group Review and Evaluation  Telemedicine Visit: The patient's condition can be safely assessed and treated via synchronous audio and visual telemedicine encounter.      Reason for Telemedicine Visit: Services only offered telehealth     Originating Site (Patient Location): Patient's home    "  Distant Site (Provider Location): Provider Remote Setting     Consent:  The patient/guardian has verbally consented to: the potential risks and benefits of telemedicine (video visit) versus in person care; bill my insurance or make self-payment for services provided; and responsibility for payment of non-covered services.      Mode of Communication:  Video Conference via [employer]     As the provider I attest to compliance with applicable laws and regulations related to telemedicine.    Group Attendance:  Attended group session    Patient's response to the group topic/interactions:  discussed personal experience with topic and expressed readiness to alter behaviors    Patient appeared to be Attentive and Engaged.        Client specific details:  Lily stated that she is doing well this week for the most part but has some frustration at work. She said she does deep breathing when frustrated and \"I smile and then walk away\" so she doesn't say something she'll regret. She said her stress is at zero and she has had no cravings and she had to tell a person who brought \"weed\" to leave her home. Her daughter-in-law taught her how to join virtual support meetings via Facebook which she will try this week. She said she liked this topic and it was reassuring that \"these negative thoughts are normal.\" This topic completes all six MH Skills Groups required in D3 of her Treatment Plan.     "

## 2021-03-03 NOTE — GROUP NOTE
"Group Therapy Documentation    PATIENT'S NAME: Lily Caldwell  MRN:   [id number]  :   1965  ACCT. NUMBER: [id number]  DATE OF SERVICE: 3/03/21  START TIME:  5:30 PM  END TIME:  7:30 PM  FACILITATOR(S): Chase Wells LADC  TOPIC: BEH Group Therapy  Number of patients attending the group:  11  Group Length:  2 Hours    Group Therapy Type: Life skill(s)    Summary of Group / Topics Discussed:    Positive Psychology    Telemedicine Visit: The patient's condition can be safely assessed and treated via synchronous audio and visual telemedicine encounter.      Reason for Telemedicine Visit: Services only offered telehealth    Originating Site (Patient Location): Patient's home    Distant Site (Provider Location): Provider Remote Setting    Consent:  The patient/guardian has verbally consented to: the potential risks and benefits of telemedicine (video visit) versus in person care; bill my insurance or make self-payment for services provided; and responsibility for payment of non-covered services.     Mode of Communication:  Video Conference via [employer]    As the provider I attest to compliance with applicable laws and regulations related to telemedicine.        Group Attendance:  Attended group session    Patient's response to the group topic/interactions:  cooperative with task    Patient appeared to be Actively participating.        Client specific details:  Client participated in the check-in process and reported no change in sobriety date.  Client listened to a reading  about the Serenity Prayer. She shared  That she heard that you have to be able to let go of your fears about what people may think of her because its out of her control. Clt said she is going to work on this with the people at work. Client watched the balance of the Happiness video and identified \"exercise\" as something she will try in an effort to reduce anxiety and become more happy and healthier.  Client stated This relates to clients " Dim 3 treatment plan goal of Learn how to apply self-care and psychotherapy to build a repertoire of daily habits that counteract addictive thinking (depression and anxiety, guilt and shame) leading to increased resiliency and well-being.   improve her level of happiness and well being..

## 2021-03-04 ENCOUNTER — HOSPITAL ENCOUNTER (OUTPATIENT)
Dept: BEHAVIORAL HEALTH | Facility: CLINIC | Age: 56
End: 2021-03-04
Attending: FAMILY MEDICINE
Payer: COMMERCIAL

## 2021-03-04 PROCEDURE — H2035 A/D TX PROGRAM, PER HOUR: HCPCS | Mod: HQ,95

## 2021-03-04 NOTE — TELEPHONE ENCOUNTER
----- Message from MIRI Richardson sent at 3/4/2021  9:54 AM CST -----  Regarding: Schedule  PLEASE SCHEDULE: GERSON MUHAMMAD [[id number]]  For 15 sessions  START DATE: 3/10/21  MIRI Richardson  Phase 2 Mixed in [place]  [id number] ON MON, WED, THUR 5:30 PM to 7:30 PM  Visit Type : Video  GROUP THERAPY VIDEO VISIT [1303]

## 2021-03-04 NOTE — GROUP NOTE
Group Therapy Documentation    PATIENT'S NAME: Lily Caldwell  MRN:   [id number]  :   1965  ACCT. NUMBER: [id number]  DATE OF SERVICE: 3/04/21  START TIME:  5:30 PM  END TIME:  7:30 PM  FACILITATOR(S): Chase Wells LADC  TOPIC: BEH Group Therapy  Number of patients attending the group:  11  Group Length:  2 Hours    Group Therapy Type: Addiction    Summary of Group / Topics Discussed:    Step One: Powerlessness and Unmanageability    Telemedicine Visit: The patient's condition can be safely assessed and treated via synchronous audio and visual telemedicine encounter.      Reason for Telemedicine Visit: Services only offered telehealth    Originating Site (Patient Location): Patient's home    Distant Site (Provider Location): Provider Remote Setting    Consent:  The patient/guardian has verbally consented to: the potential risks and benefits of telemedicine (video visit) versus in person care; bill my insurance or make self-payment for services provided; and responsibility for payment of non-covered services.     Mode of Communication:  Video Conference via [employer]    As the provider I attest to compliance with applicable laws and regulations related to telemedicine.        Group Attendance:  Attended group session    Patient's response to the group topic/interactions:  cooperative with task    Patient appeared to be Engaged.        Client specific details:  Client participated in the check-in process and reported no change in sobriety date. Client participated in a reading of Chapter 3 In the Big Book of AA dealing with Step 1. Client gained an understanding of the terms  powerlessness  and  unmanageability  and shared how they. experienced it in their life. Client shared her experience with trying to stop using was exactly like they described in the reading. This related to client Din 3 and 4 treatment plan goals..

## 2021-03-04 NOTE — PROGRESS NOTES
"[facility] Weekly Treatment Plan Review      ATTENDANCE for the following date span:  Monday, 3/2/21 thru Sunday, 3/7/21..    Date Monday Tuesday Wednesday Thursday    Group Therapy 2.0 hours 3 hours 2.0 hours 2.0 hours   Individual Therapy       Family Therapy       Other (Specify)           Patient did not have any absences during this time period (list absence dates and reason for absence).      Client is transitioning from Phase 1 to Phase 2, as of 3/10  due to completion of all 6 mental health groups. Client will be in group 6 hours total per week which meets OP parameters.    Weekly Treatment Plan Review       Treatment Plan initiated on: 1/27/21    Dimension1: Acute Intoxication/Withdrawal Potential -   Date of Last Use 1/20/21  Any reports of withdrawal symptoms - No        Dimension 2: Biomedical Conditions & Complications -   Medical Concerns:  NA  Current Medications & Medication Changes:  Current Outpatient Medications   Medication     amitriptyline (ELAVIL) 75 MG tablet     escitalopram (LEXAPRO) 10 MG tablet     hydrOXYzine (ATARAX) 25 MG tablet     Lactobacillus Rhamnosus, GG, ([facility]) capsule     omeprazole (PRILOSEC) 40 MG DR capsule     No current facility-administered medications for this encounter.      Medication Prescriber:  PCP  Taking meds as prescribed? Yes  Medication side effects or concerns:  NA  Outside medical appointments this week (list provider and reason for visit):  NA      Dimension 3: Emotional/Behavioral Conditions & Complications -   Mental health diagnosis NA    Date of last SIB:  NA  Date of  last SI:  NA  Date of last HI: NA  Behavioral Targets:    Current  Assignments:      Narrative:     Has impulse control and coping skills. Functions adequately in significant life areas. Clt reporst feeling \"grateful for group peers\".       Dimension 4: Treatment Acceptance / Resistance -   DARIEL Diagnosis:304.30 (F12.20) Cannabis Use Disorder Moderate "   Stage - 1  Commitment to tx process/Stage of change- Preparation  DARIEL assignments -NA    Narrative -    Motivated with active reinforcement, to explore Tx and strategies for change. Client reports making sober connection.     Dimension 5: Relapse / Continued Problem Potential -   Relapses this week - None  Urges to use - None  UA results -Clt is submitting UA weekly for work     Narrative-    Recognizes relapse issues and prevention strategies, but displays some vulnerability for further substance use. Client has developing coping skills being put to use. Reports looking for meetings to attend and developing sober connections. Clt reported thought of using but was able to play the tape forward and practice cognitive diffusion. Client reports return to work on 2/23 and to sharing she is in recovery with her supervisor who was supportive. Client reports exploring  sober support and looking for additional meetings and sponsor.       Dimension 6: Recovery Environment -   Family Involvement - Patient's current living/housing situation involves staying in own home/apartment.  They live with my kids, son and daughter in law and their two children live with me and they report that housing is stable. Patient identified making sober connection and exploring sober support.      Summarize attendance at family groups and family sessions - NA. Clt expressed interest in Family group and was referred.  Family supportive of treatment?  Yes    Community support group attendance - None  Recreational activities - None reported    Narrative -    Engaged in structured, meaningful activity, Client reports return to work on 2/23. Reports drug testing for next 2 years for continued employment.       Justification for Continued Treatment at this Level of Care:  Recognizes relapse issues and prevention strategies, but displays some vulnerability for further substance use. Client has developing coping skills being put to use.     Discharge  Planning:  Target Discharge Date/Timeframe:  6/7/21   Med Mgmt Provider/Appt:  EMILY   Ind therapy Provider/Appt:  EMILY   Family therapy Provider/Appt:  EMILY           Dimension Scale Review     Prior ratings: Dim1 - 0 DIM2 - 1 DIM3 - 1 DIM4 - 2 DIM5 - 3 DIM6 -2     Current ratings: Dim1 - 0 DIM2 - 1 DIM3 - 1 DIM4 - 1 DIM5 - 1 DIM6 -1       Has vulnerable adult status change? No    Are Treatment Plan goals/objectives effective? Yes  *If no, list changes to treatment plan:    Are the current goals meeting client's needs? Yes  *If no, list the changes to treatment plan.    Client Input / Response: EMILY    Individual Session Start time:  NA  Individual Session Stop Time:  NA    *Client agrees with any changes to the treatment plan: N/A  *Client received copy of changes: N/A  *Client is aware of right to access a treatment plan review: Yes

## 2021-03-07 ENCOUNTER — HEALTH MAINTENANCE LETTER (OUTPATIENT)
Age: 56
End: 2021-03-07

## 2021-03-08 ENCOUNTER — HOSPITAL ENCOUNTER (OUTPATIENT)
Dept: BEHAVIORAL HEALTH | Facility: CLINIC | Age: 56
End: 2021-03-08
Attending: FAMILY MEDICINE
Payer: COMMERCIAL

## 2021-03-08 PROCEDURE — H2035 A/D TX PROGRAM, PER HOUR: HCPCS | Mod: HQ,GT

## 2021-03-08 NOTE — GROUP NOTE
Group Therapy Documentation    PATIENT'S NAME: Lily Caldwell  MRN:   [id number]  :   1965  [facility] [id number]  DATE OF SERVICE: 3/08/21  START TIME:  5:30 PM  END TIME:  7:30 PM  FACILITATOR(S): Chase Wells LADC  TOPIC: BEH Group Therapy  Number of patients attending the group:  12  Group Length:  2 Hours    Group Therapy Type: Addiction    Summary of Group / Topics Discussed:    Psychoeducation/Skills Relapse Prevention (DARIEL)  This topic will give a general overview of basic relapse prevention, including definitions of warning signs, triggers and cravings and the importance of addressing healthy coping skills for ongoing relapse prevention.    Objective(s):    Patients will identify 4 key warning signs and triggers    Patients will identify 4 healthy coping mechanisms         Structure (modalities, homework, worksheets, etc.):    Provide psychoeducation on relapse prevention and healthy coping methods.    Facilitate group discussion around each patient s current awareness of warning signs,  triggers and cravings.     Expected therapeutic outcome(s):    Patient will:    Be able to manage triggers and cravings without returning to substance use.      Therapeutic outcomes measured by:    Patients will name 4 of their warning signs and triggers    Patients will name 4 healthy coping mechanisms for dealing with their warning signs and triggers      Group Attendance:  Attended group session    Patient's response to the group topic/interactions:  cooperative with task    Patient appeared to be Actively participating.        Client specific details:  Client participated in weekly check-in process with group and identified 3 reasons to stay sober. Client explored the topic of Relapse prevention by learning how predictable and identifiable warning signs that accumulate and snowball and can play a role in triggering the addictive thinking that precedes a return to use episode. Clt shared that for her it can  happen when she is being idle8 with no recovery structure her head naturally goes to thoughts of using.  Group participated in brief mediation followed by reading and discussion from Daily Reflections. Client wrapped up the review of the topic relapse prevention by sharing their individual relapse prevention plans in group. This relates to client Dim 5 treatment plan goal..

## 2021-03-09 NOTE — TELEPHONE ENCOUNTER
----- Message from MIRI Richardson sent at 3/8/2021  6:09 PM CST -----  Regarding: schedule  PLEASE SCHEDULE:  For 15 sessions  START DATE: 3/10/21  MIRI Richardson  Phase 2 Mixed in [place]  [id number] ON MON, WED, THUR 5:30 PM to 7:30 PM  Visit Type : Video  GROUP THERAPY VIDEO VISIT [3121]

## 2021-03-10 ENCOUNTER — HOSPITAL ENCOUNTER (OUTPATIENT)
Dept: BEHAVIORAL HEALTH | Facility: CLINIC | Age: 56
End: 2021-03-10
Attending: FAMILY MEDICINE
Payer: COMMERCIAL

## 2021-03-10 PROCEDURE — H2035 A/D TX PROGRAM, PER HOUR: HCPCS | Mod: HQ,95

## 2021-03-10 NOTE — GROUP NOTE
Group Therapy Documentation    PATIENT'S NAME: Lily Caldwell  MRN:   [id number]  :   1965  ACCT. NUMBER: [id number]  DATE OF SERVICE: 3/10/21  START TIME:  5:30 PM  END TIME:  7:30 PM  FACILITATOR(S): Chase Wells LADC  TOPIC: BEH Group Therapy  Number of patients attending the group:  13  Group Length:  2 Hours    Group Therapy Type: Addiction    Summary of Group / Topics Discussed:    Choices in Recovery    Telemedicine Visit: The patient's condition can be safely assessed and treated via synchronous audio and visual telemedicine encounter.      Reason for Telemedicine Visit: Services only offered telehealth    Originating Site (Patient Location): Patient's home    Distant Site (Provider Location): Provider Remote Setting    Consent:  The patient/guardian has verbally consented to: the potential risks and benefits of telemedicine (video visit) versus in person care; bill my insurance or make self-payment for services provided; and responsibility for payment of non-covered services.     Mode of Communication:  Video Conference via [employer]    As the provider I attest to compliance with applicable laws and regulations related to telemedicine.       Group Attendance:  Attended group session    Patient's response to the group topic/interactions:  cooperative with task    Patient appeared to be Attentive.        Client specific details:  Client participated in the check-in process and reported no change in sobriety date.  Reported anxiety of 1 /10 and motivation for recovery of   10/10. He stated that her cravings have been 0 on a 1-10 scale The balance of group time was spent watching the video which will be completed at the beginning of group on Wednesday. The brief period of discussion at close of group was particularly somber. Group members were asked to take some time to process what they saw, reflect on their reactions to Delgado deterioration over 40 years and to see if they can identify any  parallels to their own substance use.. This relates to client Dim 3 treatment plan goal of Learn how to apply self-care and psychotherapy to build a repertoire of daily habits that counteract addictive thinking (depression and anxiety, guilt and shame) leading to increased resiliency and well-being.

## 2021-03-15 ENCOUNTER — HOSPITAL ENCOUNTER (OUTPATIENT)
Dept: BEHAVIORAL HEALTH | Facility: CLINIC | Age: 56
End: 2021-03-15
Attending: FAMILY MEDICINE
Payer: COMMERCIAL

## 2021-03-15 PROCEDURE — H2035 A/D TX PROGRAM, PER HOUR: HCPCS | Mod: HQ,GT

## 2021-03-15 NOTE — GROUP NOTE
Group Therapy Documentation    PATIENT'S NAME: Lily Caldwell  MRN:   [id number]  :   1965  [id number]  DATE OF SERVICE: 3/15/21  START TIME:  5:30 PM  END TIME:  7:30 PM  FACILITATOR(S): Chase Wells LADC  TOPIC: BEH Group Therapy  Number of patients attending the group:  11  Group Length:  2 Hours    Group Therapy Type: Addiction    Summary of Group / Topics Discussed:    Progressive and Chronic Disease    Telemedicine Visit: The patient's condition can be safely assessed and treated via synchronous audio and visual telemedicine encounter.      Reason for Telemedicine Visit: Services only offered telehealth    Originating Site (Patient Location): Patient's home    Distant Site (Provider Location): Provider Remote Setting    Consent:  The patient/guardian has verbally consented to: the potential risks and benefits of telemedicine (video visit) versus in person care; bill my insurance or make self-payment for services provided; and responsibility for payment of non-covered services.     Mode of Communication:  Video Conference via [employer]    As the provider I attest to compliance with applicable laws and regulations related to telemedicine.        Group Attendance:  Attended group session    Patient's response to the group topic/interactions:  cooperative with task    Patient appeared to be Attentive.        Client specific details:  Client participated in the check-in process and reported no change in sobriety date. Client listened to peer present treatment plan assignment in group and provided feedback. Clt participated in brief group guided meditation followed by reading from Daily Reflections. Reported anxiety of 2 /10 and a personal strength of being a good listener. The balance of group time was spent watching the video which was started during group on Wednesday 3/10. Group members were asked to take some time to process what they saw, reflect on their reactions to Delgado  deterioration over 40 years and to see if they can identify what if any parallels to their own substance use.. This relates to client Dim 3 treatment plan goal of Learn how to apply self-care and psychotherapy to build a repertoire of daily habits that counteract addictive thinking (depression and anxiety, guilt and shame) leading to increased resiliency and well-being. .

## 2021-03-17 ENCOUNTER — HOSPITAL ENCOUNTER (OUTPATIENT)
Dept: BEHAVIORAL HEALTH | Facility: CLINIC | Age: 56
End: 2021-03-17
Attending: FAMILY MEDICINE
Payer: COMMERCIAL

## 2021-03-17 PROCEDURE — H2035 A/D TX PROGRAM, PER HOUR: HCPCS | Mod: HQ,95

## 2021-03-17 NOTE — PROGRESS NOTES
"[facility] Weekly Treatment Plan Review      ATTENDANCE for the following date span:  Monday, 3/2/21 thru Sunday, 3/7/21..    Date Monday Wednesday Thursday    Group Therapy 2.0 hours 2.0 hours 2 hours   Individual Therapy      Family Therapy      Other (Specify)          Patient did not have any absences during this time period (list absence dates and reason for absence).      Weekly Treatment Plan Review       Treatment Plan initiated on: 1/27/21    Dimension1: Acute Intoxication/Withdrawal Potential -   Date of Last Use 1/20/21  Any reports of withdrawal symptoms - No        Dimension 2: Biomedical Conditions & Complications -   Medical Concerns:  NA  Current Medications & Medication Changes:  Current Outpatient Medications   Medication     amitriptyline (ELAVIL) 75 MG tablet     escitalopram (LEXAPRO) 10 MG tablet     hydrOXYzine (ATARAX) 25 MG tablet     Lactobacillus Rhamnosus, GG, ([facility]) capsule     omeprazole (PRILOSEC) 40 MG DR capsule     No current facility-administered medications for this encounter.      Medication Prescriber:  PCP  Taking meds as prescribed? Yes  Medication side effects or concerns:  NA  Outside medical appointments this week (list provider and reason for visit):  NA      Dimension 3: Emotional/Behavioral Conditions & Complications -   Mental health diagnosis NA    Date of last SIB:  NA  Date of  last SI:  NA  Date of last HI: NA  Behavioral Targets:  Reduced symptoms of anxiety  Current  Assignments:  Triggers and Symtoms    Narrative:  Has impulse control and coping skills. Functions adequately in significant life areas. Clt reporst feeling \"grateful for group peers\".      Dimension 4: Treatment Acceptance / Resistance -   DARIEL Diagnosis:304.30 (F12.20) Cannabis Use Disorder Moderate   Stage - 1  Commitment to tx process/Stage of change- Preparation  DARIEL assignments -Client to present symptoms assignment    Narrative -    Motivated with active " reinforcement, to explore Tx and strategies for change. Client reports making sober connection.     Dimension 5: Relapse / Continued Problem Potential -   Relapses this week - None  Urges to use - None  UA results -Clt is submitting UA weekly for work     Narrative-    Recognizes relapse issues and prevention strategies, but displays some vulnerability for further substance use. Client has developing coping skills being put to use. Reports looking for meetings to attend and developing sober connections. Clt reported thought of using but was able to play the tape forward and practice cognitive diffusion. Client reports return to work on 2/23 and to sharing she is in recovery with her supervisor who was supportive. Client reports exploring  sober support and looking for additional meetings and sponsor.       Dimension 6: Recovery Environment -   Family Involvement - Patient's current living/housing situation involves staying in own home/apartment.  They live with my kids, son and daughter in law and their two children live with me and they report that housing is stable. Patient identified making sober connection and exploring sober support.      Summarize attendance at family groups and family sessions - NA. Clt expressed interest in Family group and was referred.  Family supportive of treatment?  Yes    Community support group attendance - Yes. Clt reports attending AA weekly  Recreational activities - None reported    Narrative -    Engaged in structured, meaningful activity, Client reports return to work on 2/23. Reports drug testing for next 2 years for continued employment.       Justification for Continued Treatment at this Level of Care:  Recognizes relapse issues and prevention strategies, but displays some vulnerability for further substance use. Client has developing coping skills being put to use.     Discharge Planning:  Target Discharge Date/Timeframe:  6/7/21   Med Mgmt Provider/Appt:  EMILY   Ind therapy  Provider/Appt:  EMILY   Family therapy Provider/Appt:  EMILY           Dimension Scale Review     Prior ratings: Dim1 - 0 DIM2 - 1 DIM3 - 1 DIM4 - 2 DIM5 - 3 DIM6 -2     Current ratings: Dim1 - 0 DIM2 - 1 DIM3 - 1 DIM4 - 0 DIM5 -0 DIM6 -1       Has vulnerable adult status change? No    Are Treatment Plan goals/objectives effective? Yes  *If no, list changes to treatment plan:    Are the current goals meeting client's needs? Yes  *If no, list the changes to treatment plan.    Client Input / Response: EMILY    Individual Session Start time:  NA  Individual Session Stop Time:  NA    *Client agrees with any changes to the treatment plan: N/A  *Client received copy of changes: N/A  *Client is aware of right to access a treatment plan review: Yes

## 2021-03-17 NOTE — GROUP NOTE
Group Therapy Documentation    PATIENT'S NAME: Lily Caldwell  MRN:   [id number]  :   1965  ACCT. NUMBER: [id number]  DATE OF SERVICE: 3/17/21  START TIME:  5:30 PM  END TIME:  7:30 PM  FACILITATOR(S): Chase Wells LADC  TOPIC: BEH Group Therapy  Number of patients attending the group:  11  Group Length:  2 Hours    Group Therapy Type: Addiction    Summary of Group / Topics Discussed:    Choices in Recovery  Telemedicine Visit: The patient's condition can be safely assessed and treated via synchronous audio and visual telemedicine encounter.      Reason for Telemedicine Visit: Services only offered telehealth    Originating Site (Patient Location): Patient's home    Distant Site (Provider Location): Provider Remote Setting    Consent:  The patient/guardian has verbally consented to: the potential risks and benefits of telemedicine (video visit) versus in person care; bill my insurance or make self-payment for services provided; and responsibility for payment of non-covered services.     Mode of Communication:  Video Conference via [employer]    As the provider I attest to compliance with applicable laws and regulations related to telemedicine.       Group Attendance:  Attended group session    Patient's response to the group topic/interactions:  cooperative with task    Patient appeared to be Attentive.        Client specific details:  Client participated with the check-in process and reported no change in sobriety date.  Client expresses feeling accomplished  Client actively participated in guided meditation to settle in and become present.  Client participated in discussion of peers  assignments.  He provided supportive feedback and spoke about how he related to recovery goals and warning signs he may be getting off-track. Client processed in small groups questions and issues that can help to identify and address possible ambivalence and help sort conflicting feelings towards recovery. She shared  revisiting advantages of making change  This addressed his dimension 4 goal of developing internal motivation and readiness to change.

## 2021-03-18 ENCOUNTER — HOSPITAL ENCOUNTER (OUTPATIENT)
Dept: BEHAVIORAL HEALTH | Facility: CLINIC | Age: 56
End: 2021-03-18
Attending: FAMILY MEDICINE
Payer: COMMERCIAL

## 2021-03-18 PROCEDURE — H2035 A/D TX PROGRAM, PER HOUR: HCPCS | Mod: HQ,GT

## 2021-03-18 NOTE — GROUP NOTE
"Group Therapy Documentation    PATIENT'S NAME: Lily Caldwell  MRN:   [id number]  :   1965  ACCT. NUMBER: [id number]  DATE OF SERVICE: 3/18/21  START TIME:  5:30 PM  END TIME:  7:30 PM  FACILITATOR(S): Chase Wells LADC  TOPIC: BEH Group Therapy  Number of patients attending the group:  11  Group Length:  2 Hours    Group Therapy Type: Psychoeducation    Summary of Group / Topics Discussed:    Stages of Change    Telemedicine Visit: The patient's condition can be safely assessed and treated via synchronous audio and visual telemedicine encounter.      Reason for Telemedicine Visit: Services only offered telehealth    Originating Site (Patient Location): Patient's home    Distant Site (Provider Location): Provider Remote Setting    Consent:  The patient/guardian has verbally consented to: the potential risks and benefits of telemedicine (video visit) versus in person care; bill my insurance or make self-payment for services provided; and responsibility for payment of non-covered services.     Mode of Communication:  Video Conference via [employer]    As the provider I attest to compliance with applicable laws and regulations related to telemedicine.       Group Attendance:  Attended group session    Patient's response to the group topic/interactions:  cooperative with task    Patient appeared to be Engaged.        Client specific details:  Client participated in the check-in process and reported no change in sobriety date.  Client reports feeling happy today.  Client reviewed and taught back elements of Stages of Change relating to his Dimension 4 goal of increasing internal motivation to change.  Client expressed she thought she was currently in the action stage of the DiClemente-Prochaska model.  Client actively participated in a \"Be with the Breath  meditation/breathing exercise and expressed it was a helpful.  Client participated, provided supportive feedback, and spoke about how he related to " symptoms and signs described in peer s assignment.   .

## 2021-03-22 ENCOUNTER — HOSPITAL ENCOUNTER (OUTPATIENT)
Dept: BEHAVIORAL HEALTH | Facility: CLINIC | Age: 56
End: 2021-03-22
Attending: FAMILY MEDICINE
Payer: COMMERCIAL

## 2021-03-22 PROCEDURE — H2035 A/D TX PROGRAM, PER HOUR: HCPCS | Mod: HQ,95

## 2021-03-24 ENCOUNTER — HOSPITAL ENCOUNTER (OUTPATIENT)
Dept: BEHAVIORAL HEALTH | Facility: CLINIC | Age: 56
End: 2021-03-24
Attending: FAMILY MEDICINE
Payer: COMMERCIAL

## 2021-03-24 PROCEDURE — H2035 A/D TX PROGRAM, PER HOUR: HCPCS | Mod: HQ,GT

## 2021-03-24 NOTE — GROUP NOTE
Group Therapy Documentation    PATIENT'S NAME: Lily Caldwell  MRN:   [id number]  :   1965  [id number]  DATE OF SERVICE: 3/24/21  START TIME:  5:30 PM  END TIME:  7:30 PM  FACILITATOR(S): Chase Wells LADC  TOPIC: BEH Group Therapy  Number of patients attending the group: 11  Group Length:  2 Hours    Group Therapy Type: Addiction    Summary of Group / Topics Discussed:    Psychoeducation/Skills Relapse Prevention (DARIEL)  This topic will give a general overview of basic relapse prevention, including definitions of warning signs, triggers and cravings and the importance of addressing healthy coping skills for ongoing relapse prevention.    Objective(s):    Patients will identify 4 key warning signs and triggers    Patients will identify 4 healthy coping mechanisms         Structure (modalities, homework, worksheets, etc.):    Provide psychoeducation on relapse prevention and healthy coping methods.    Facilitate group discussion around each patient s current awareness of warning signs,  triggers and cravings.     Expected therapeutic outcome(s):    Patient will:    Be able to manage triggers and cravings without returning to substance use.      Therapeutic outcomes measured by:    Patients will name 4 of their warning signs and triggers    Patients will name 4 healthy coping mechanisms for dealing with their warning signs and triggers    Telemedicine Visit: The patient's condition can be safely assessed and treated via synchronous audio and visual telemedicine encounter.      Reason for Telemedicine Visit: Services only offered telehealth    Originating Site (Patient Location): Patient's home    Distant Site (Provider Location): Provider Remote Setting    Consent:  The patient/guardian has verbally consented to: the potential risks and benefits of telemedicine (video visit) versus in person care; bill my insurance or make self-payment for services provided; and responsibility for payment of  non-covered services.     Mode of Communication:  Video Conference via [employer]    As the provider I attest to compliance with applicable laws and regulations related to telemedicine.        Group Attendance:  Attended group session    Patient's response to the group topic/interactions:  cooperative with task    Patient appeared to be Actively participating.        Client specific details:  Client participated in the check-in process and reported no change in sobriety date.  Client explored the topic of Relapse prevention by learning how predictable and identifiable warning signs that accumulate and snowball and can play a role in triggering the addictive thinking that precedes a return to use episode.  Client spent group time exploring and identifying their triggers people, places and things) and Julito s Warning Signs and Phases of a relapse. Client identified 2  warning signs and triggers of irritability and poor self care and named 2 coping mechanisms of calling sober support and mantra. During group process time clients further explored their warning signs and experience with relapse and what coping skills/tools they find most effective.  This relates to client Dim 5 treatment plan goal.

## 2021-03-25 ENCOUNTER — HOSPITAL ENCOUNTER (OUTPATIENT)
Dept: BEHAVIORAL HEALTH | Facility: CLINIC | Age: 56
End: 2021-03-25
Attending: FAMILY MEDICINE
Payer: COMMERCIAL

## 2021-03-25 PROCEDURE — H2035 A/D TX PROGRAM, PER HOUR: HCPCS | Mod: HQ,GT

## 2021-03-25 NOTE — GROUP NOTE
"Group Therapy Documentation    PATIENT'S NAME: Lily Caldwell  MRN:   [id number]  :   1965  ACCT. NUMBER: [id number]  DATE OF SERVICE: 3/25/21  START TIME:  5:30 PM  END TIME:  7:30 PM  FACILITATOR(S): Chase Wells LADC  TOPIC: BEH Group Therapy  Number of patients attending the group: 10  Group Length:  2 Hours    Group Therapy Type:  Addiction    Summary of Group / Topics Discussed:    Barriers to Recovery and \"Honesty, Openness and Willingness\"      Telemedicine Visit: The patient's condition can be safely assessed and treated via synchronous audio and visual telemedicine encounter.      Reason for Telemedicine Visit: Services only offered telehealth    Originating Site (Patient Location): Patient's home    Distant Site (Provider Location): Provider Remote Setting    Consent:  The patient/guardian has verbally consented to: the potential risks and benefits of telemedicine (video visit) versus in person care; bill my insurance or make self-payment for services provided; and responsibility for payment of non-covered services.     Mode of Communication:  Video Conference via [employer]    As the provider I attest to compliance with applicable laws and regulations related to telemedicine.       Group Attendance:  Attended group session    Patient's response to the group topic/interactions:  cooperative with task    Patient appeared to be Attentive.        Client specific details:  Client participated in group introductions followed by a brief guided meditation for mindfulness skill development and a group reading from a daily inspirational book. Group explored and discussed topics of \"barriers to recovery and  Honesty, Openness and Willingness . self-assessed in group their barriers to recovery and shared how it has thwarted previous thoughts and attempts to stop using. Client shared that low self confidence   was a major barrier in the past and the group processed a good way to build self confidence was " to practice daily positive self affirmations as being very helpful.  This relates to clients Dim 3 treatment plan goal. .

## 2021-03-29 ENCOUNTER — HOSPITAL ENCOUNTER (OUTPATIENT)
Dept: BEHAVIORAL HEALTH | Facility: CLINIC | Age: 56
End: 2021-03-29
Attending: FAMILY MEDICINE
Payer: COMMERCIAL

## 2021-03-29 PROCEDURE — H2035 A/D TX PROGRAM, PER HOUR: HCPCS | Mod: HQ,95

## 2021-03-29 NOTE — GROUP NOTE
Group Therapy Documentation    PATIENT'S NAME: Lily Caldwell  MRN:   [id number]  :   1965  [id number]  DATE OF SERVICE: 3/29/21  START TIME:  5:30 PM  END TIME:  7:30 PM  FACILITATOR(S): Chase Wells LADC  TOPIC: BEH Group Therapy  Number of patients attending the group:  12  Group Length:  2 Hours    Group Therapy Type: Neurobiology of Addiction    Summary of Group / Topics Discussed:    Psychoeducation/Skills Neurobiology (DARIEL)  This topic will give a general overview of the brain chemistry involved with addiction.    Objective(s):    Client will identify at least 2 primary neurotransmitters involved with addiction.    Client will identify basic brain regions involved with addiction.    Client will be able to explain the relationship of brain chemistry to post-acute withdrawal symptoms.    Structure:    Provide psychoeducation on neurotransmitters and brain regions involved in addiction to illustrate how frontal cortex executive functioning is diminished by substance use disorder.    Provide psychoeducation on how biochemical imbalances resulting from substance use may lead to PAWS.    Facilitate group discussion around each patient s current beliefs of whether addiction is a disease or a moral failing and how that may have changed.    Facilitate group discussion around each patient s current symptomology.    Use teach-back techniques to ensure patients  understanding.    Provide patients with handouts to enhance learning.    Expected therapeutic outcomes:     Reduce confusion about post-acute withdrawal symptoms to better manage them.    Therapeutic outcome(s) measured by:    Patient s ability to teach-back information learned in group.    Patient s demonstration of learning by completion of post-quiz.    Telemedicine Visit: The patient's condition can be safely assessed and treated via synchronous audio and visual telemedicine encounter.      Reason for Telemedicine Visit: Services only  offered telehealth    Originating Site (Patient Location): Patient's home  CC  Distant Site (Provider Location): [facility] [place]    Consent:  The patient/guardian has verbally consented to: the potential risks and benefits of telemedicine (video visit) versus in person care; bill my insurance or make self-payment for services provided; and responsibility for payment of non-covered services.     Mode of Communication:  Video Conference via [employer]    As the provider I attest to compliance with applicable laws and regulations related to telemedicine.       Group Attendance:  Attended group session    Patient's response to the group topic/interactions:  cooperative with task    Patient appeared to be Attentive.        Client specific details:  Client participated with the check-in process and reported no change in sobriety date. Client reported recovery action since last group of attending Worship and an AA meeting  Client shared his motivation for recovery was 10/10 and anxiety was 0/10.Client watched a portion of Pleasure Unwoven video and Identified with addiction being a disease of  the ability to choose . Client shared phone numbers and agreed to call each other prior to next group. This relates to DIM 3 t goal of Learn how to apply self-care and psychotherapy to build a repertoire of daily habits that increased resiliency and well-being..

## 2021-03-31 ENCOUNTER — HOSPITAL ENCOUNTER (OUTPATIENT)
Dept: BEHAVIORAL HEALTH | Facility: CLINIC | Age: 56
End: 2021-03-31
Attending: FAMILY MEDICINE
Payer: COMMERCIAL

## 2021-03-31 PROCEDURE — H2035 A/D TX PROGRAM, PER HOUR: HCPCS | Mod: HQ,GT

## 2021-03-31 NOTE — GROUP NOTE
"Group Therapy Documentation    PATIENT'S NAME: Lily Caldwell  MRN:   [id number]  :   1965  ACCT. NUMBER: [id number]  DATE OF SERVICE: 3/31/21  START TIME:  5:30 PM  END TIME:  7:30 PM  FACILITATOR(S): Chase Wells LADC  TOPIC: BEH Group Therapy  Number of patients attending the group:  11  Group Length:  2 Hours    Group Therapy Type: Addiction    Summary of Group / Topics Discussed:    Neurobiology of addiction.  Finish Pleasure Unwoven    Telemedicine Visit: The patient's condition can be safely assessed and treated via synchronous audio and visual telemedicine encounter.      Reason for Telemedicine Visit: Services only offered telehealth    Originating Site (Patient Location): Patient's home    Distant Site (Provider Location): Provider Remote Setting    Consent:  The patient/guardian has verbally consented to: the potential risks and benefits of telemedicine (video visit) versus in person care; bill my insurance or make self-payment for services provided; and responsibility for payment of non-covered services.     Mode of Communication:  Video Conference via [employer]    As the provider I attest to compliance with applicable laws and regulations related to telemedicine.       Group Attendance:  Attended group session    Patient's response to the group topic/interactions:  cooperative with task    Patient appeared to be Actively participating.        Client specific details:  Client participated with the check-in process and reported no change in sobriety date. Client said that her recovery goal is to find the strength to \"put myself out there more for her recovery and others\" .Client watched a portion of Pleasure Unwoven video and Identified with addiction being a disease of  the ability to choose . Client shared phone numbers and agreed to call each other prior to next group. This relates to DIM 3 t goal of Learn how to apply self-care and psychotherapy to build a repertoire of daily habits that " increased resiliency and well-being.

## 2021-04-01 ENCOUNTER — HOSPITAL ENCOUNTER (OUTPATIENT)
Dept: BEHAVIORAL HEALTH | Facility: CLINIC | Age: 56
End: 2021-04-01
Attending: FAMILY MEDICINE
Payer: COMMERCIAL

## 2021-04-01 PROCEDURE — H2035 A/D TX PROGRAM, PER HOUR: HCPCS | Mod: HQ,GT

## 2021-04-01 NOTE — PROGRESS NOTES
"[facility] Weekly Treatment Plan Review      ATTENDANCE for the following date span:  Monday, 3/2/21 thru Sunday, 3/7/21..    Date Monday Wednesday Thursday    Group Therapy 2.0 hours 2.0 hours 2 hours   Individual Therapy      Family Therapy      Other (Specify)          Patient did not have any absences during this time period (list absence dates and reason for absence).      Weekly Treatment Plan Review       Treatment Plan initiated on: 1/27/21    Dimension1: Acute Intoxication/Withdrawal Potential -   Date of Last Use 1/20/21  Any reports of withdrawal symptoms - No        Dimension 2: Biomedical Conditions & Complications -   Medical Concerns:  NA  Current Medications & Medication Changes:  Current Outpatient Medications   Medication     amitriptyline (ELAVIL) 75 MG tablet     escitalopram (LEXAPRO) 10 MG tablet     hydrOXYzine (ATARAX) 25 MG tablet     Lactobacillus Rhamnosus, GG, ([facility] WELLNESS) capsule     omeprazole (PRILOSEC) 40 MG DR capsule     No current facility-administered medications for this encounter.      Medication Prescriber:  PCP  Taking meds as prescribed? Yes  Medication side effects or concerns:  NA  Outside medical appointments this week (list provider and reason for visit):  NA      Dimension 3: Emotional/Behavioral Conditions & Complications -   Mental health diagnosis NA    Date of last SIB:  NA  Date of  last SI:  NA  Date of last HI: NA  Behavioral Targets:  Reduced symptoms of anxiety  Current  Assignments:  Triggers and Symtoms    Narrative:  Has impulse control and coping skills. Functions adequately in significant life areas. Clt reporst feeling \"grateful for group peers\".      Dimension 4: Treatment Acceptance / Resistance -   DARIEL Diagnosis:304.30 (F12.20) Cannabis Use Disorder Moderate   Stage - 1  Commitment to tx process/Stage of change- Preparation  DARIEL assignments -Client to present symptoms assignment    Narrative -    Motivated with active " reinforcement, to explore Tx and strategies for change. Client reports making sober connection.     Dimension 5: Relapse / Continued Problem Potential -   Relapses this week - None  Urges to use - None  UA results -Clt is submitting UA weekly for work     Narrative-    Recognizes relapse issues and prevention strategies, but displays some vulnerability for further substance use. Client has developing coping skills being put to use. Reports looking for meetings to attend and developing sober connections. Clt reported thought of using but was able to play the tape forward and practice cognitive diffusion. Client reports return to work on 2/23 and to sharing she is in recovery with her supervisor who was supportive. Client reports exploring  sober support and looking for additional meetings and sponsor.       Dimension 6: Recovery Environment -   Family Involvement - Patient's current living/housing situation involves staying in own home/apartment.  They live with my kids, son and daughter in law and their two children live with me and they report that housing is stable. Patient identified making sober connection and exploring sober support.      Summarize attendance at family groups and family sessions - NA. Clt expressed interest in Family group and was referred.  Family supportive of treatment?  Yes    Community support group attendance - Yes. Clt reports attending AA weekly  Recreational activities - None reported    Narrative -    Engaged in structured, meaningful activity, Client reports return to work on 2/23. Reports drug testing for next 2 years for continued employment.       Justification for Continued Treatment at this Level of Care:  Recognizes relapse issues and prevention strategies, but displays some vulnerability for further substance use. Client has developing coping skills being put to use.     Discharge Planning:  Target Discharge Date/Timeframe:  6/7/21   Med Mgmt Provider/Appt:  EMILY   Ind therapy  Provider/Appt:  EMILY   Family therapy Provider/Appt:  EMILY           Dimension Scale Review     Prior ratings: Dim1 - 0 DIM2 - 1 DIM3 - 1 DIM4 - 2 DIM5 - 3 DIM6 -2     Current ratings: Dim1 - 0 DIM2 - 1 DIM3 - 1 DIM4 - 0 DIM5 -0 DIM6 -1       Has vulnerable adult status change? No    Are Treatment Plan goals/objectives effective? Yes  *If no, list changes to treatment plan:    Are the current goals meeting client's needs? Yes  *If no, list the changes to treatment plan.    Client Input / Response: EMILY    Individual Session Start time:  NA  Individual Session Stop Time:  NA    *Client agrees with any changes to the treatment plan: N/A  *Client received copy of changes: N/A  *Client is aware of right to access a treatment plan review: Yes

## 2021-04-01 NOTE — GROUP NOTE
Group Therapy Documentation    PATIENT'S NAME: Lily Caldwell  MRN:   [id number]  :   1965  ACCT. NUMBER: [id number]  DATE OF SERVICE: 21  START TIME:  5:30 PM  END TIME:  7:30 PM  FACILITATOR(S): Chase Wells LADC  TOPIC: BEH Group Therapy  Number of patients attending the group:  10  Group Length:  2 Hours    Group Therapy Type: Addiction    Summary of Group / Topics Discussed:    Of Addiction Danish Neurobiology     Telemedicine Visit: The patient's condition can be safely assessed and treated via synchronous audio and visual telemedicine encounter.      Reason for Telemedicine Visit: Services only offered telehealth    Originating Site (Patient Location): Patient's home    Distant Site (Provider Location): Provider Remote Setting    Consent:  The patient/guardian has verbally consented to: the potential risks and benefits of telemedicine (video visit) versus in person care; bill my insurance or make self-payment for services provided; and responsibility for payment of non-covered services.     Mode of Communication:  Video Conference via Ushi    As the provider I attest to compliance with applicable laws and regulations related to telemedicine.       Group Attendance:  Attended group session    Patient's response to the group topic/interactions:  cooperative with task    Patient appeared to be Attentive.        Client specific details:  Clt said her self talk was supportive and encouraging this week. She shared her patience and tolerance this week was at 9 /10. She has plans to celebrate her granddaughters birthday and attend [facility] services Client finished Pleasure Unwoven video and Identified with addiction being a disease of  the ability to choose .Client identified the roles of  2 neurotransmitters Glutamate and Dopamine in addiction. Client finished group with a discussion of Forgiveness and Grief and the roles they can play in addiction. Client identified as recovery actions  they will take over the weekend to support their recovery.

## 2021-04-05 ENCOUNTER — HOSPITAL ENCOUNTER (OUTPATIENT)
Dept: BEHAVIORAL HEALTH | Facility: CLINIC | Age: 56
End: 2021-04-05
Attending: FAMILY MEDICINE
Payer: COMMERCIAL

## 2021-04-05 PROCEDURE — H2035 A/D TX PROGRAM, PER HOUR: HCPCS | Mod: HQ,95

## 2021-04-05 NOTE — GROUP NOTE
Group Therapy Documentation    PATIENT'S NAME: Lily Caldwell  MRN:   [id number]  :   1965  [id number]  DATE OF SERVICE: 21  START TIME:  5:30 PM  END TIME:  7:30 PM  FACILITATOR(S): Chase Wells LADC  TOPIC: BEH Group Therapy  Number of patients attending the group:  10  Group Length:  2 Hours    Group Therapy Type: Addiction    Summary of Group / Topics Discussed:    Core Values Exploration    Telemedicine Visit: The patient's condition can be safely assessed and treated via synchronous audio and visual telemedicine encounter.      Reason for Telemedicine Visit: Services only offered telehealth    Originating Site (Patient Location): Patient's home    Distant Site (Provider Location): Provider Remote Setting    Consent:  The patient/guardian has verbally consented to: the potential risks and benefits of telemedicine (video visit) versus in person care; bill my insurance or make self-payment for services provided; and responsibility for payment of non-covered services.     Mode of Communication:  Video Conference via VoCare    As the provider I attest to compliance with applicable laws and regulations related to telemedicine.        Group Attendance:  Attended group session    Patient's response to the group topic/interactions:  cooperative with task    Patient appeared to be Attentive.        Client specific details:  Clt reported anxiety levels of 0/10 and going to the gym 2x's last week. Client participated in group discussion and exploration of personal values and how this relates to developing a knowledge of their individual strengths. Clt shared that she is focused on her value of family relationships.She wants to make the time to be with her parents and grow better quality relationships with her 39 y.o. son who has been an active druu user since the age of 18 and doesn't really understand how to be a good parent. Client engaged in group discussions of Strength exploration  and identification. Client used knowledge of core values to assist them in a group discussion regarding establishing individual healthy boundaries..

## 2021-04-07 ENCOUNTER — HOSPITAL ENCOUNTER (OUTPATIENT)
Dept: BEHAVIORAL HEALTH | Facility: CLINIC | Age: 56
End: 2021-04-07
Attending: FAMILY MEDICINE
Payer: COMMERCIAL

## 2021-04-07 PROCEDURE — H2035 A/D TX PROGRAM, PER HOUR: HCPCS | Mod: HQ,GT

## 2021-04-07 NOTE — GROUP NOTE
Group Therapy Documentation    PATIENT'S NAME: Lily Caldwell  MRN:   [id number]  :   1965  ACCT. NUMBER: [id number]  DATE OF SERVICE: 21  START TIME:  5:30 PM  END TIME:  7:30 PM  FACILITATOR(S): Chase Wells LADC  TOPIC: BEH Group Therapy  Number of patients attending the group:  9  Group Length:  2 Hours    Group Therapy Type: Psychoeducation    Summary of Group / Topics Discussed:    Personal Strengths Exploration    Telemedicine Visit: The patient's condition can be safely assessed and treated via synchronous audio and visual telemedicine encounter.      Reason for Telemedicine Visit: Services only offered telehealth    Originating Site (Patient Location): Patient's home    Distant Site (Provider Location): Provider Remote Setting    Consent:  The patient/guardian has verbally consented to: the potential risks and benefits of telemedicine (video visit) versus in person care; bill my insurance or make self-payment for services provided; and responsibility for payment of non-covered services.     Mode of Communication:  Video Conference via Thuuz    As the provider I attest to compliance with applicable laws and regulations related to telemedicine.       Group Attendance:  Attended group session    Patient's response to the group topic/interactions:  discussed personal experience with topic    Patient appeared to be Attentive.        Client specific details:  Clt used knowledge of core values and strengths to identify goals that are congruent with those values and build self esteem. Client shared she struggles with low self esteem because of being over weight and is taking action of going to the gym to exercise. She said she always feels better when she goes.  This related to Client Dim 3 goal: Learn how to apply self-care and psychotherapy to build a repertoire of daily habits that counteract PAWS, fatigue, depression and anxiety leading to increased resiliency and well-being.

## 2021-04-08 ENCOUNTER — HOSPITAL ENCOUNTER (OUTPATIENT)
Dept: BEHAVIORAL HEALTH | Facility: CLINIC | Age: 56
End: 2021-04-08
Attending: FAMILY MEDICINE
Payer: COMMERCIAL

## 2021-04-08 PROCEDURE — H2035 A/D TX PROGRAM, PER HOUR: HCPCS | Mod: HQ,95

## 2021-04-08 NOTE — GROUP NOTE
Group Therapy Documentation    PATIENT'S NAME: Lily Caldwell  MRN:   [id number]  :   1965  ACCT. NUMBER: [id number]  DATE OF SERVICE: 21  START TIME:  5:30 PM  END TIME:  7:30 PM  FACILITATOR(S): Chase Wells LADC  TOPIC: BEH Group Therapy  Number of patients attending the group:    Group Length:  2 Hours    Group Therapy Type: Addiction    Summary of Group / Topics Discussed:    Choices in Recovery and boundaries    Telemedicine Visit: The patient's condition can be safely assessed and treated via synchronous audio and visual telemedicine encounter.      Reason for Telemedicine Visit: Services only offered telehealth    Originating Site (Patient Location): Patient's home    Distant Site (Provider Location): Provider Remote Setting    Consent:  The patient/guardian has verbally consented to: the potential risks and benefits of telemedicine (video visit) versus in person care; bill my insurance or make self-payment for services provided; and responsibility for payment of non-covered services.     Mode of Communication:  Video Conference via [employer]ere    As the provider I attest to compliance with applicable laws and regulations related to telemedicine.       Group Attendance:  Attended group session    Patient's response to the group topic/interactions:  cooperative with task    Patient appeared to be Engaged.        Client specific details: Clt checked by identifying a personal strength of being a hard worker and a core value of dedication .Clts listened to representative from [employer] [employer] provide an overview of the program and answered questions from group about this program of recovery. Clients used knowledge of personal values and strengths to explore and identify the need to have personal boundaries. Client reviewed and evaluated their protective factors and how they might improve them. explored and identified personal boundaries needed to support their recovery.  This related to Client Dim  3 goal: Learn how to apply self-care and psychotherapy to build a repertoire of daily habits that counteract PAWS, fatigue, depression and anxiety leading to increased resiliency and well-being..

## 2021-04-08 NOTE — PROGRESS NOTES
"[facility] Weekly Treatment Plan Review      ATTENDANCE for the following date span:  Monday, 4/5/21 thru Sunday, 4/11/21.    Date Monday Wednesday Thursday    Group Therapy 2.0 hours 2.0 hours 2 hours   Individual Therapy      Family Therapy      Other (Specify)          Patient did not have any absences during this time period (list absence dates and reason for absence).  Client will be transitioning to Phase 3 on 4/13/21 and will be attending group every Tuesday from 530pm to 730pm for 8 weeks.    Weekly Treatment Plan Review       Treatment Plan initiated on: 1/27/21    Dimension1: Acute Intoxication/Withdrawal Potential -   Date of Last Use 1/20/21  Any reports of withdrawal symptoms - No        Dimension 2: Biomedical Conditions & Complications -   Medical Concerns:  NA  Current Medications & Medication Changes:  Current Outpatient Medications   Medication     amitriptyline (ELAVIL) 75 MG tablet     escitalopram (LEXAPRO) 10 MG tablet     hydrOXYzine (ATARAX) 25 MG tablet     Lactobacillus Rhamnosus, GG, ([facility] WELLNESS) capsule     omeprazole (PRILOSEC) 40 MG DR capsule     No current facility-administered medications for this encounter.      Medication Prescriber:  PCP  Taking meds as prescribed? Yes  Medication side effects or concerns:  NA  Outside medical appointments this week (list provider and reason for visit):  NA      Dimension 3: Emotional/Behavioral Conditions & Complications -   Mental health diagnosis NA    Date of last SIB:  NA  Date of  last SI:  NA  Date of last HI: NA  Behavioral Targets:  Reduced symptoms of anxiety and increased impluse control  Current  Assignments:  Triggers and Symtoms    Narrative:  Has impulse control and coping skills. Functions adequately in significant life areas. Clt reporst feeling \"grateful for group peers\". Clt reports exercise.      Dimension 4: Treatment Acceptance / Resistance -   DARIEL Diagnosis:304.30 (F12.20) Cannabis Use Disorder " Moderate   Stage - 1  Commitment to tx process/Stage of change- Preparation  DARIEL assignments -Client to present symptoms assignment    Narrative -    Motivated with active reinforcement, to explore Tx and strategies for change. Client reports making sober connection.      Dimension 5: Relapse / Continued Problem Potential -   Relapses this week - None  Urges to use - None  UA results -Clt is submitting UA weekly for work     Narrative-    Recognizes relapse issues and prevention strategies, but displays some vulnerability for further substance use. Client has developing coping skills being put to use. Reports looking for meetings to attend and developing sober connections. Clt reported thought of using but was able to play the tape forward and practice cognitive diffusion. Client reports return to work on 2/23 and to sharing she is in recovery with her supervisor who was supportive. Client reports exploring  sober support and looking for additional meetings and sponsor.       Dimension 6: Recovery Environment -   Family Involvement - Patient's current living/housing situation involves staying in own home/apartment.  They live with my kids, son and daughter in law and their two children live with me and they report that housing is stable. Patient identified making sober connection and exploring sober support.      Summarize attendance at family groups and family sessions - NA. Clt expressed interest in Family group and was referred.  Family supportive of treatment?  Yes    Community support group attendance - Yes. Clt reports attending AA weekly  Recreational activities - None reported    Narrative -    Engaged in structured, meaningful activity. Reports drug testing for next 2 years for continued employment. Clt attends sober support and is working with a sponsor.       Justification for Continued Treatment at this Level of Care:  Recognizes relapse issues and prevention strategies, but displays some vulnerability  for further substance use. Client has developing coping skills being put to use.     Discharge Planning:  Target Discharge Date/Timeframe:  6/7/21   Med Mgmt Provider/Appt:  EMILY   Ind therapy Provider/Appt:  EMILY   Family therapy Provider/Appt:  EMILY           Dimension Scale Review     Prior ratings: Dim1 - 0 DIM2 - 1 DIM3 - 1 DIM4 - 2 DIM5 - 3 DIM6 -2     Current ratings: Dim1 - 0 DIM2 - 1 DIM3 - 1 DIM4 - 0 DIM5 -0 DIM6 -1       Has vulnerable adult status change? No    Are Treatment Plan goals/objectives effective? Yes  *If no, list changes to treatment plan:    Are the current goals meeting client's needs? Yes  *If no, list the changes to treatment plan.    Client Input / Response: EMILY    Individual Session Start time:  EMILY  Individual Session Stop Time:  NA    *Client agrees with any changes to the treatment plan: N/A  *Client received copy of changes: N/A  *Client is aware of right to access a treatment plan review: Yes

## 2021-04-12 ENCOUNTER — HOSPITAL ENCOUNTER (OUTPATIENT)
Dept: BEHAVIORAL HEALTH | Facility: CLINIC | Age: 56
End: 2021-04-12
Attending: FAMILY MEDICINE
Payer: COMMERCIAL

## 2021-04-12 PROCEDURE — H2035 A/D TX PROGRAM, PER HOUR: HCPCS | Mod: HQ,95

## 2021-04-12 NOTE — GROUP NOTE
Group Therapy Documentation    PATIENT'S NAME: Lily Caldwell  MRN:   [id number]  :   1965  [id number]  DATE OF SERVICE: 21  START TIME:  5:30 PM  END TIME:  7:30 PM  FACILITATOR(S): Chase Wells LADC  TOPIC: BEH Group Therapy  Number of patients attending the group:  8  Group Length:  2 Hours    Group Therapy Type: Addiction    Summary of Group / Topics Discussed:    Guilt and Shame     Telemedicine Visit: The patient's condition can be safely assessed and treated via synchronous audio and visual telemedicine encounter.      Reason for Telemedicine Visit: Services only offered telehealth    Originating Site (Patient Location): Patient's home    Distant Site (Provider Location): Provider Remote Setting    Consent:  The patient/guardian has verbally consented to: the potential risks and benefits of telemedicine (video visit) versus in person care; bill my insurance or make self-payment for services provided; and responsibility for payment of non-covered services.     Mode of Communication:  Video Conference via Playerize    As the provider I attest to compliance with applicable laws and regulations related to telemedicine.        Group Attendance:  Attended group session    Patient's response to the group topic/interactions:  cooperative with task    Patient appeared to be Engaged.        Client specific details:  Clt said she shared at a meeting hiw difficult it was this week to deal with the death of William. A person in the meeting said he was really glad he came that night because he was only 6 days sober from heroin and hearing her share really helped him. Client participated in group introductions followed by a brief guided meditation for mindfulness skill development and a group reading from a daily inspirational book.Client watched video discussing negative effects of guilt and shame and participated in group discussion on impact of Guilt and Shame in their lives with ideas for  resolution. Clients played   Recovery Football  where they asked each other recovery questions and questions regarding  whats working   specific actions they are taking to support their personal recovery.

## 2021-04-13 ENCOUNTER — HOSPITAL ENCOUNTER (OUTPATIENT)
Dept: BEHAVIORAL HEALTH | Facility: CLINIC | Age: 56
End: 2021-04-13
Attending: FAMILY MEDICINE
Payer: COMMERCIAL

## 2021-04-13 PROCEDURE — H2035 A/D TX PROGRAM, PER HOUR: HCPCS | Mod: HQ,95

## 2021-04-13 NOTE — GROUP NOTE
"Group Therapy Documentation    PATIENT'S NAME: Lily Caldwell  MRN:   [id number]  :   1965  ACCT. NUMBER: [id number]  DATE OF SERVICE: 21  START TIME:  5:30 PM  END TIME:  7:30 PM  FACILITATOR(S): Chase Wells LADC  TOPIC: BEH Group Therapy  Number of patients attending the group:  6  Group Length:  2 Hours    Group Therapy Type: Life skill(s)    Summary of Group / Topics Discussed:    Stress Management    Telemedicine Visit: The patient's condition can be safely assessed and treated via synchronous audio and visual telemedicine encounter.      Reason for Telemedicine Visit: Services only offered telehealth    Originating Site (Patient Location): Patient's home    Distant Site (Provider Location): Provider Remote Setting    Consent:  The patient/guardian has verbally consented to: the potential risks and benefits of telemedicine (video visit) versus in person care; bill my insurance or make self-payment for services provided; and responsibility for payment of non-covered services.     Mode of Communication:  Video Conference via [id number]    As the provider I attest to compliance with applicable laws and regulations related to telemedicine.        Group Attendance:  Attended group session    Patient's response to the group topic/interactions:  cooperative with task    Patient appeared to be Engaged.        Client specific details:  Clt descibed her recovery structure she has in place on her first phase 3 group. She said she is attending AA meetings weekly, has a sponsor, practices meditation and is going to the gym.Client participated in weekly check-in process with group and identified 3 reasons to stay sober. Client watched video \"How to be good at Stress  and discussed the challenges that can be anticipated in early recovery and the recommended habits/tools that can aid develop stress tolerance in the recovery process. Clients explored the topic of building stress tolerance and resilience in " recovery by examining stress triggering threats and strategies/Reponses to counter threats. Client learned to view stressful situations as opportunity to improve skills, knowledge and strengths that will allow the experience of stress  inoculation  or stress -related growth..

## 2021-04-14 NOTE — PROGRESS NOTES
"[facility] Weekly Treatment Plan Review      ATTENDANCE for the following date span:  Monday, 4/12/21 thru Sunday, 4/18/21.    Date Tuesday    Group Therapy 2.0 hours   Individual Therapy    Family Therapy    Other (Specify)        Patient did not have any absences during this time period (list absence dates and reason for absence).  Client will be transitioned to Phase 3 on 4/13/21 and will be attending group every Tuesday from 530pm to 730pm for 8 weeks.    Weekly Treatment Plan Review       Treatment Plan initiated on: 1/27/21    Dimension1: Acute Intoxication/Withdrawal Potential -   Date of Last Use 1/20/21  Any reports of withdrawal symptoms - No        Dimension 2: Biomedical Conditions & Complications -   Medical Concerns:  NA  Current Medications & Medication Changes:  Current Outpatient Medications   Medication     amitriptyline (ELAVIL) 75 MG tablet     escitalopram (LEXAPRO) 10 MG tablet     hydrOXYzine (ATARAX) 25 MG tablet     Lactobacillus Rhamnosus, GG, ([facility]) capsule     omeprazole (PRILOSEC) 40 MG DR capsule     No current facility-administered medications for this encounter.      Medication Prescriber:  PCP  Taking meds as prescribed? Yes  Medication side effects or concerns:  NA  Outside medical appointments this week (list provider and reason for visit):  NA      Dimension 3: Emotional/Behavioral Conditions & Complications -   Mental health diagnosis NA    Date of last SIB:  NA  Date of  last SI:  NA  Date of last HI: NA  Behavioral Targets:  Reduced symptoms of anxiety and increased impluse control  Current  Assignments:  Triggers and Symtoms    Narrative:  Has impulse control and coping skills. Functions adequately in significant life areas. Clt reporst feeling \"grateful for group peers\". Clt reports exercise.      Dimension 4: Treatment Acceptance / Resistance -   DARIEL Diagnosis:304.30 (F12.20) Cannabis Use Disorder Moderate   Stage - 1  Commitment to tx " process/Stage of change- Preparation  DAIREL assignments -Client to present symptoms assignment    Narrative -    Motivated with active reinforcement, to explore Tx and strategies for change. Client reports making sober connection.      Dimension 5: Relapse / Continued Problem Potential -   Relapses this week - None  Urges to use - None  UA results -Clt is submitting UA weekly for work     Narrative-    Recognizes relapse issues and prevention strategies. Client has developing coping skills being put to use. Reports looking for additional meetings to attend and developing sober connections. Clt reported thought of using but was able to play the tape forward and practice cognitive diffusion. Client reports exploring  sober support and looking for additional meetings and has found a sponsor.       Dimension 6: Recovery Environment -   Family Involvement - Patient's current living/housing situation involves staying in own home/apartment.  They live with my kids, son and daughter in law and their two children live with me and they report that housing is stable. Patient identified making sober connection and exploring sober support.      Summarize attendance at family groups and family sessions - NA. Clt expressed interest in Family group and was referred.  Family supportive of treatment?  Yes    Community support group attendance - Yes. Clt reports attending AA weekly  Recreational activities - None reported    Narrative -    Engaged in structured, meaningful activity. Reports drug testing for next 2 years for continued employment. Clt attends sober support and is working with a sponsor.       Justification for Continued Treatment at this Level of Care:  Recognizes relapse issues and prevention strategies. Client has developing coping skills being put to use.     Discharge Planning:  Target Discharge Date/Timeframe:  6/7/21   Med Mgmt Provider/Appt:  EMILY   Ind therapy Provider/Appt:  EMILY   Family therapy Provider/Appt:  EMILY            Dimension Scale Review     Prior ratings: Dim1 - 0 DIM2 - 1 DIM3 - 1 DIM4 - 2 DIM5 - 3 DIM6 -2     Current ratings: Dim1 - 0 DIM2 - 1 DIM3 - 0 DIM4 - 0 DIM5 -0 DIM6 -1       Has vulnerable adult status change? No    Are Treatment Plan goals/objectives effective? Yes  *If no, list changes to treatment plan:    Are the current goals meeting client's needs? Yes  *If no, list the changes to treatment plan.    Client Input / Response: NA    Individual Session Start time:  NA  Individual Session Stop Time:  NA    *Client agrees with any changes to the treatment plan: N/A  *Client received copy of changes: N/A  *Client is aware of right to access a treatment plan review: Yes

## 2021-04-20 ENCOUNTER — HOSPITAL ENCOUNTER (OUTPATIENT)
Dept: BEHAVIORAL HEALTH | Facility: CLINIC | Age: 56
End: 2021-04-20
Attending: FAMILY MEDICINE
Payer: COMMERCIAL

## 2021-04-20 PROCEDURE — H2035 A/D TX PROGRAM, PER HOUR: HCPCS | Mod: HQ,95

## 2021-04-20 NOTE — GROUP NOTE
Group Therapy Documentation    PATIENT'S NAME: Lily Caldwell  MRN:   [id number]  :   1965  [facility] [id number]  DATE OF SERVICE: 21  START TIME:  5:30 PM  END TIME:  7:30 PM  FACILITATOR(S): Chase Wells LADC  TOPIC: BEH Group Therapy  Number of patients attending the group:  8  Group Length:  2 Hours    Group Therapy Type: Life skill(s)    Summary of Group / Topics Discussed:    Choices in Recovery    Telemedicine Visit: The patient's condition can be safely assessed and treated via synchronous audio and visual telemedicine encounter.      Reason for Telemedicine Visit: Services only offered telehealth    Originating Site (Patient Location): Patient's home    Distant Site (Provider Location): Provider Remote Setting    Consent:  The patient/guardian has verbally consented to: the potential risks and benefits of telemedicine (video visit) versus in person care; bill my insurance or make self-payment for services provided; and responsibility for payment of non-covered services.     Mode of Communication:  Video Conference via [employer]    As the provider I attest to compliance with applicable laws and regulations related to telemedicine.        Group Attendance:  Attended group session    Patient's response to the group topic/interactions:  cooperative with task    Patient appeared to be Attentive.        Client specific details: Client participated in discussion about coping skills and the need to have sober support and its potential to impact recovery in the future. Develop sober coping and living skills. Client shared a portion of his Relapse Prevention Plan in group. Client was interested in addressing spiritual blockers that can block her from a connection with her higher power. She thought that learning to regularly practice Gratitude was something that is a big part of her program.

## 2021-04-21 NOTE — ADDENDUM NOTE
Encounter addended by: Chase Wells LADC on: 4/21/2021 11:59 AM   Actions taken: Clinical Note Signed

## 2021-04-21 NOTE — PROGRESS NOTES
"[facility] Weekly Treatment Plan Review      ATTENDANCE for the following date span:  Monday, 4/119/21 thru Sunday, 4/25/21.1.    Date Tuesday    Group Therapy 2.0 hours   Individual Therapy    Family Therapy    Other (Specify)        Patient did not have any absences during this time period (list absence dates and reason for absence).    Client  transitioned to Phase 3 on 4/13/21 and will be attending group every Tuesday from 530pm to 730pm for 8 weeks.    Weekly Treatment Plan Review       Treatment Plan initiated on: 1/27/21    Dimension1: Acute Intoxication/Withdrawal Potential -   Date of Last Use 1/20/21  Any reports of withdrawal symptoms - No        Dimension 2: Biomedical Conditions & Complications -   Medical Concerns:  NA  Current Medications & Medication Changes:  Current Outpatient Medications   Medication     amitriptyline (ELAVIL) 75 MG tablet     escitalopram (LEXAPRO) 10 MG tablet     hydrOXYzine (ATARAX) 25 MG tablet     Lactobacillus Rhamnosus, GG, ([facility]) capsule     omeprazole (PRILOSEC) 40 MG DR capsule     No current facility-administered medications for this encounter.      Medication Prescriber:  PCP  Taking meds as prescribed? Yes  Medication side effects or concerns:  NA  Outside medical appointments this week (list provider and reason for visit):  NA      Dimension 3: Emotional/Behavioral Conditions & Complications -   Mental health diagnosis NA    Date of last SIB:  NA  Date of  last SI:  NA  Date of last HI: NA  Behavioral Targets:  Reduced symptoms of anxiety and increased impluse control  Current  Assignments:  Triggers and Symtoms    Narrative:  Has impulse control and coping skills. Functions adequately in significant life areas. Clt reporst feeling \"grateful for group peers\". Clt reports exercise.      Dimension 4: Treatment Acceptance / Resistance -   DARIEL Diagnosis:304.30 (F12.20) Cannabis Use Disorder Moderate   Stage - 1  Commitment to tx " process/Stage of change- Preparation  DARIEL assignments -Client to present symptoms assignment    Narrative -    Motivated with active reinforcement, to explore Tx and strategies for change. Client reports making sober connections. Client is in action stage of change.     Dimension 5: Relapse / Continued Problem Potential -   Relapses this week - None  Urges to use - None  UA results -Clt is submitting UA for work     Narrative-    Recognizes relapse issues and prevention strategies. Client has developing coping skills being put to use. Reports looking for additional meetings to attend and developing sober connections. Clt reported thought of using but was able to play the tape forward and practice cognitive diffusion. Client reports exploring  sober support and looking for additional meetings and has found a sponsor. Clt is building substantial recovery structure to support LTR>       Dimension 6: Recovery Environment -   Family Involvement - Patient's current living/housing situation involves staying in own home/apartment.  They live with my kids, son and daughter in law and their two children live with me and they report that housing is stable. Patient identified making sober connection and exploring sober support.      Summarize attendance at family groups and family sessions - NA. Clt expressed interest in Family group and was referred.  Family supportive of treatment?  Yes    Community support group attendance - Yes. Clt reports attending AA weekly  Recreational activities - None reported    Narrative -    Engaged in structured, meaningful activity. Reports drug testing for next 2 years for continued employment. Clt attends sober support and is working with a sponsor.       Justification for Continued Treatment at this Level of Care:  Recognizes relapse issues and prevention strategies. Client has developing coping skills being put to use.     Discharge Planning:  Target Discharge Date/Timeframe:  6/7/21   Med  Mgmt Provider/Appt:  EMILY   Ind therapy Provider/Appt:  EMILY   Family therapy Provider/Appt:  EMILY           Dimension Scale Review     Prior ratings: Dim1 - 0 DIM2 - 1 DIM3 - 1 DIM4 - 2 DIM5 - 3 DIM6 -2     Current ratings: Dim1 - 0 DIM2 - 1 DIM3 - 0 DIM4 - 0 DIM5 -0 DIM6 -1       Has vulnerable adult status change? No    Are Treatment Plan goals/objectives effective? Yes  *If no, list changes to treatment plan:    Are the current goals meeting client's needs? Yes  *If no, list the changes to treatment plan.    Client Input / Response: NA    Individual Session Start time:  NA  Individual Session Stop Time:  NA    *Client agrees with any changes to the treatment plan: N/A  *Client received copy of changes: N/A  *Client is aware of right to access a treatment plan review: Yes

## 2021-04-27 ENCOUNTER — HOSPITAL ENCOUNTER (OUTPATIENT)
Dept: BEHAVIORAL HEALTH | Facility: CLINIC | Age: 56
End: 2021-04-27
Attending: FAMILY MEDICINE
Payer: COMMERCIAL

## 2021-04-27 PROCEDURE — H2035 A/D TX PROGRAM, PER HOUR: HCPCS | Mod: HQ,95

## 2021-04-27 NOTE — PROGRESS NOTES
"[facility] Weekly Treatment Plan Review      ATTENDANCE for the following date span:  Monday, 4/26/21 thru Sunday, 5/2/21    Date Tuesday    Group Therapy 2.0 hours   Individual Therapy    Family Therapy    Other (Specify)        Patient did not have any absences during this time period (list absence dates and reason for absence).    Client  transitioned to Phase 3 on 4/13/21 and will be attending group every Tuesday from 530pm to 730pm for 8 weeks.    Weekly Treatment Plan Review       Treatment Plan initiated on: 1/27/21    Dimension1: Acute Intoxication/Withdrawal Potential -   Date of Last Use 1/20/21  Any reports of withdrawal symptoms - No        Dimension 2: Biomedical Conditions & Complications -   Medical Concerns:  NA  Current Medications & Medication Changes:  Current Outpatient Medications   Medication     amitriptyline (ELAVIL) 75 MG tablet     escitalopram (LEXAPRO) 10 MG tablet     hydrOXYzine (ATARAX) 25 MG tablet     Lactobacillus Rhamnosus, GG, ([facility]) capsule     omeprazole (PRILOSEC) 40 MG DR capsule     No current facility-administered medications for this encounter.      Medication Prescriber:  PCP  Taking meds as prescribed? Yes  Medication side effects or concerns:  NA  Outside medical appointments this week (list provider and reason for visit):  NA      Dimension 3: Emotional/Behavioral Conditions & Complications -   Mental health diagnosis NA    Date of last SIB:  NA  Date of  last SI:  NA  Date of last HI: NA  Behavioral Targets:  Reduced symptoms of anxiety and increased impluse control  Current MH Assignments:  Meditation and Journaling    Narrative:  Has impulse control and coping skills. Functions adequately in significant life areas. Clt reporst feeling \"grateful for group peers\". Clt reports exercise.      Dimension 4: Treatment Acceptance / Resistance -   DARIEL Diagnosis:304.30 (F12.20) Cannabis Use Disorder Moderate   Stage - Action  Commitment to tx " process/Stage of change- Preparation  DARIEL assignments -Client to present recovery care plan    Narrative -    Motivated with active reinforcement, to explore Tx and strategies for change. Client reports making sober connections. Client is in action stage of change.     Dimension 5: Relapse / Continued Problem Potential -   Relapses this week - None  Urges to use - None  UA results -Clt is submitting UA for work     Narrative-    Recognizes relapse issues and prevention strategies. Client has developing coping skills being put to use. Reports looking for additional meetings to attend and developing sober connections. Clt reported thought of using but was able to play the tape forward and practice cognitive diffusion. Client reports exploring  sober support and looking for additional meetings and has found a sponsor. Clt is building substantial recovery structure to support LTR>       Dimension 6: Recovery Environment -   Family Involvement - Patient's current living/housing situation involves staying in own home/apartment.  They live with my kids, son and daughter in law and their two children live with me and they report that housing is stable. Patient identified making sober connection and exploring sober support.      Summarize attendance at family groups and family sessions - NA. Clt expressed interest in Family group and was referred.  Family supportive of treatment?  Yes    Community support group attendance - Yes. Clt reports attending AA weekly  Recreational activities - None reported    Narrative -    Engaged in structured, meaningful activity. Reports drug testing for next 2 years for continued employment. Clt attends sober support and is working with a sponsor.       Justification for Continued Treatment at this Level of Care:  Recognizes relapse issues and prevention strategies. Client has developing coping skills being put to use.     Discharge Planning:  Target Discharge Date/Timeframe:  6/7/21   Med  Mgmt Provider/Appt:  EMILY   Ind therapy Provider/Appt:  EMILY   Family therapy Provider/Appt:  EMLIY           Dimension Scale Review     Prior ratings: Dim1 - 0 DIM2 - 1 DIM3 - 1 DIM4 - 2 DIM5 - 3 DIM6 -2     Current ratings: Dim1 - 0 DIM2 - 1 DIM3 - 0 DIM4 - 0 DIM5 -0 DIM6 -0       Has vulnerable adult status change? No    Are Treatment Plan goals/objectives effective? Yes  *If no, list changes to treatment plan:    Are the current goals meeting client's needs? Yes  *If no, list the changes to treatment plan.    Client Input / Response: NA    Individual Session Start time:  NA  Individual Session Stop Time:  NA    *Client agrees with any changes to the treatment plan: N/A  *Client received copy of changes: N/A  *Client is aware of right to access a treatment plan review: Yes
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
## 2021-05-11 ENCOUNTER — HOSPITAL ENCOUNTER (OUTPATIENT)
Dept: BEHAVIORAL HEALTH | Facility: CLINIC | Age: 56
End: 2021-05-11
Attending: FAMILY MEDICINE
Payer: COMMERCIAL

## 2021-05-11 PROCEDURE — H2035 A/D TX PROGRAM, PER HOUR: HCPCS | Mod: HQ,GT

## 2021-05-11 NOTE — GROUP NOTE
Group Therapy Documentation    PATIENT'S NAME: Lily Caldwell  MRN:   [id number]  [facility]   1965  [id number]  DATE OF SERVICE: 21  START TIME:  5:30 PM  END TIME:  7:30 PM  FACILITATOR(S): Chase Wells LADC  TOPIC: BEH Group Therapy  Number of patients attending the group:  8  Group Length:  2 Hours    Group Therapy Type: Life skill(s)    Summary of Group / Topics Discussed:    Coping Skills/Lifestyle Managemet and Choices in Recovery: Recovery Planning and Goal Setting   D5 and D6, Clients discussed in group their readiness for recovery and the 1st step and what recovery means to them. Client participated in group discussions on goal planning for phase 3.  Clients listened to another peer present his Recovery Care Plan and followed with questions and feedback. Strong emphasis placed on sober support attendance and developing sober connections.    Telemedicine Visit: The patient's condition can be safely assessed and treated via synchronous audio and visual telemedicine encounter.      Reason for Telemedicine Visit: Services only offered telehealth    Originating Site (Patient Location): Patient's home    Distant Site (Provider Location): Provider Remote Setting    Consent:  The patient/guardian has verbally consented to: the potential risks and benefits of telemedicine (video visit) versus in person care; bill my insurance or make self-payment for services provided; and responsibility for payment of non-covered services.     Mode of Communication:  Video Conference via [employer]    As the provider I attest to compliance with applicable laws and regulations related to telemedicine.       Group Attendance:  Attended group session    Patient's response to the group topic/interactions:  cooperative with task    Patient appeared to be Engaged.        Client specific details:  Clt shared she has admitted the powerlessness in step one. She descibed she is working on finding new meetings so she  can build sober support. This week she will attend the meeting in [place]..

## 2021-05-18 ENCOUNTER — HOSPITAL ENCOUNTER (OUTPATIENT)
Dept: BEHAVIORAL HEALTH | Facility: CLINIC | Age: 56
End: 2021-05-18
Attending: FAMILY MEDICINE
Payer: COMMERCIAL

## 2021-05-18 PROCEDURE — H2035 A/D TX PROGRAM, PER HOUR: HCPCS | Mod: HQ,GT

## 2021-05-18 NOTE — GROUP NOTE
Group Therapy Documentation    PATIENT'S NAME: Lily Caldwell  MRN:   [id number]  :   1965  [id number]  DATE OF SERVICE: 21  START TIME:  5:30 PM  END TIME:  7:30 PM  FACILITATOR(S): Chase Wells LADC  TOPIC: BEH Group Therapy  Number of patients attending the group:  9  Group Length:  2 Hours    Group Therapy Type: Life skill(s)    Summary of Group / Topics Discussed:    Coping Skills/Lifestyle Managemet and Choices in Recovery  Clients checked in and talked about their week since last group and recovery action taken. Clients discussed in group their readiness for long term recovery and relapse prevention identifying what's required and what they needed. Client participated in group discussions on goal planning for phase 3 and the  8 Essentials for Recovery . Clts listened to peer present his recovery care plan in group with questions and feedback as follow up. Strong emphasis placed on sober support attendance and connections being made in recovery community. This related to Clients Dim 6 tx plan goal.    Telemedicine Visit: The patient's condition can be safely assessed and treated via synchronous audio and visual telemedicine encounter.      Reason for Telemedicine Visit: Services only offered telehealth    Originating Site (Patient Location): Patient's home    Distant Site (Provider Location): Provider Remote Setting    Consent:  The patient/guardian has verbally consented to: the potential risks and benefits of telemedicine (video visit) versus in person care; bill my insurance or make self-payment for services provided; and responsibility for payment of non-covered services.     Mode of Communication:  Video Conference via [employer]    As the provider I attest to compliance with applicable laws and regulations related to telemedicine.       Group Attendance:  Attended group session    Patient's response to the group topic/interactions:  cooperative with task    Patient  "appeared to be Engaged.        Client specific details:  Joset checked in and reported no change in sobriety date. Félix shared.her motive for recovery is \"to have a good life and not let my life go by with me sitting on the couch\". She took action to achieve this by attending [employer] and going to her NA emeeting. She shared she went to a baby shower and a woman gave her a hug from behind and it was someone from her meeting. This made her feel real good. She also shared that her daughter in law asked if she could go with her to her mtg which was a big deal.  "

## 2021-05-19 NOTE — PROGRESS NOTES
"[facility] Weekly Treatment Plan Review      ATTENDANCE for the following date span:  Monday, 5/10/21 through Sunday 5/16/21.      Date Tuesday    Group Therapy 2 hours   Individual Therapy    Family Therapy    Other (Specify)        No absences this week.    Weekly Treatment Plan Review       Treatment Plan initiated on: 1/27/21    Dimension1: Acute Intoxication/Withdrawal Potential -   Date of Last Use 1/20/21  Any reports of withdrawal symptoms - No        Dimension 2: Biomedical Conditions & Complications -   Medical Concerns:  NA  Current Medications & Medication Changes:  Current Outpatient Medications   Medication     amitriptyline (ELAVIL) 75 MG tablet     escitalopram (LEXAPRO) 10 MG tablet     hydrOXYzine (ATARAX) 25 MG tablet     Lactobacillus Rhamnosus, GG, ([facility]) capsule     omeprazole (PRILOSEC) 40 MG DR capsule     No current facility-administered medications for this encounter.      Medication Prescriber:  PCP  Taking meds as prescribed? Yes  Medication side effects or concerns:  NA  Outside medical appointments this week (list provider and reason for visit):  NA      Dimension 3: Emotional/Behavioral Conditions & Complications -   Mental health diagnosis NA    Date of last SIB:  NA  Date of  last SI:  NA  Date of last HI: NA  Behavioral Targets:  Reduced symptoms of anxiety and increased impluse control  Current MH Assignments:  Meditation and Journaling    Narrative:  Has impulse control and coping skills. Functions adequately in significant life areas. Clt reporst feeling \"grateful for group peers\". Clt reports exercise.      Dimension 4: Treatment Acceptance / Resistance -   DARIEL Diagnosis:304.30 (F12.20) Cannabis Use Disorder Moderate   Stage - Action  Commitment to tx process/Stage of change- Action  DARIEL assignments -Client to present recovery care plan    Narrative -    Motivated with active reinforcement, to explore Tx and strategies for change. Client reports " making sober connections. Client is in action stage of change.     Dimension 5: Relapse / Continued Problem Potential -   Relapses this week - None  Urges to use - None  UA results -Clt is submitting UA for work     Narrative-    Recognizes relapse issues and prevention strategies. Client has developing coping skills being put to use. Reports looking for additional meetings to attend and developing sober connections. Clt reported thought of using but was able to play the tape forward and practice cognitive diffusion. Client reports exploring  sober support and looking for additional meetings and has found a sponsor. Clt is building substantial recovery structure to support LTR. Clt establishing recovery boundaries with family.       Dimension 6: Recovery Environment -   Family Involvement - Patient's current living/housing situation involves staying in own home/apartment.  They live with my kids, son and daughter in law and their two children live with me and they report that housing is stable. Patient identified making sober connection and exploring sober support.      Summarize attendance at family groups and family sessions - NA. Clt expressed interest in Family group and was referred.  Family supportive of treatment?  Yes    Community support group attendance - Yes. Clt reports attending AA weekly  Recreational activities - None reported    Narrative -    Engaged in structured, meaningful activity. Reports drug testing for next 2 years for continued employment. Clt attends sober support and is working with a sponsor.       Justification for Continued Treatment at this Level of Care:  Recognizes relapse issues and prevention strategies. Client has developing coping skills being put to use.     Discharge Planning:  Target Discharge Date/Timeframe:  6/7/21   Med Mgmt Provider/Appt:  EMILY   Ind therapy Provider/Appt:  EMILY   Family therapy Provider/Appt:  EMILY           Dimension Scale Review     Prior ratings: Dim1 - 0  DIM2 - 1 DIM3 - 1 DIM4 - 2 DIM5 - 3 DIM6 -2     Current ratings: Dim1 - 0 DIM2 - 1 DIM3 - 0 DIM4 - 0 DIM5 -0 DIM6 -0       Has vulnerable adult status change? No    Are Treatment Plan goals/objectives effective? Yes  *If no, list changes to treatment plan:    Are the current goals meeting client's needs? Yes  *If no, list the changes to treatment plan.    Client Input / Response: NA    Individual Session Start time:  NA  Individual Session Stop Time:  NA    *Client agrees with any changes to the treatment plan: N/A  *Client received copy of changes: N/A  *Client is aware of right to access a treatment plan review: Yes

## 2021-05-19 NOTE — PROGRESS NOTES
"[facility] Weekly Treatment Plan Review      ATTENDANCE for the following date span:  Monday, 5/17/21 through Sunday 5/23/21.        Date Tuesday    Group Therapy 2 hours   Individual Therapy    Family Therapy    Other (Specify)        Client attended programming this week with no absences    Weekly Treatment Plan Review       Treatment Plan initiated on: 1/27/21    Dimension1: Acute Intoxication/Withdrawal Potential -   Date of Last Use 1/20/21  Any reports of withdrawal symptoms - No        Dimension 2: Biomedical Conditions & Complications -   Medical Concerns:  NA  Current Medications & Medication Changes:  Current Outpatient Medications   Medication     amitriptyline (ELAVIL) 75 MG tablet     escitalopram (LEXAPRO) 10 MG tablet     hydrOXYzine (ATARAX) 25 MG tablet     Lactobacillus Rhamnosus, GG, ([facility]) capsule     omeprazole (PRILOSEC) 40 MG DR capsule     No current facility-administered medications for this encounter.      Medication Prescriber:  PCP  Taking meds as prescribed? Yes  Medication side effects or concerns:  NA  Outside medical appointments this week (list provider and reason for visit):  NA      Dimension 3: Emotional/Behavioral Conditions & Complications -   Mental health diagnosis NA    Date of last SIB:  NA  Date of  last SI:  NA  Date of last HI: NA  Behavioral Targets:  Reduced symptoms of anxiety and increased impluse control  Current MH Assignments:  Meditation and Journaling    Narrative:  Has impulse control and coping skills. Functions adequately in significant life areas. Clt reporst feeling \"grateful for group peers\". Clt reports exercise.      Dimension 4: Treatment Acceptance / Resistance -   DARIEL Diagnosis:304.30 (F12.20) Cannabis Use Disorder Moderate   Stage - Action  Commitment to tx process/Stage of change- Action  DARIEL assignments -Client to present recovery care plan    Narrative -    Motivated with active reinforcement, to explore Tx and " strategies for change. Client reports making sober connections. Client is in action stage of change.     Dimension 5: Relapse / Continued Problem Potential -   Relapses this week - None  Urges to use - None  UA results -Clt is submitting UA for work     Narrative-    Recognizes relapse issues and prevention strategies. Client has developing coping skills being put to use. Reports looking for additional meetings to attend and developing sober connections. Clt reported thought of using but was able to play the tape forward and practice cognitive diffusion. Client reports exploring  sober support and looking for additional meetings and has found a sponsor. Clt is building substantial recovery structure to support LTR. Clt establishing recovery boundaries with family. Presented 'Is my Anger due to unmet expectations ' assignment in group.       Dimension 6: Recovery Environment -   Family Involvement - Patient's current living/housing situation involves staying in own home/apartment.  They live with my kids, son and daughter in law and their two children live with me and they report that housing is stable. Patient identified making sober connection and exploring sober support.      Summarize attendance at family groups and family sessions - NA. Clt expressed interest in Family group and was referred.  Family supportive of treatment?  Yes    Community support group attendance - Yes. Clt reports attending AA weekly  Recreational activities - None reported    Narrative -    Engaged in structured, meaningful activity. Reports drug testing for next 2 years for continued employment. Clt attends sober support and is working with a sponsor.       Justification for Continued Treatment at this Level of Care:  Recognizes relapse issues and prevention strategies. Client has developing coping skills being put to use.     Discharge Planning:  Target Discharge Date/Timeframe:  6/7/21   Med Mgmt Provider/Appt:  EMILY   Ind therapy  Provider/Appt:  EMILY   Family therapy Provider/Appt:  EMILY           Dimension Scale Review     Prior ratings: Dim1 - 0 DIM2 - 1 DIM3 - 1 DIM4 - 2 DIM5 - 3 DIM6 -2     Current ratings: Dim1 - 0 DIM2 - 1 DIM3 - 0 DIM4 - 0 DIM5 -0 DIM6 -0       Has vulnerable adult status change? No    Are Treatment Plan goals/objectives effective? Yes  *If no, list changes to treatment plan:    Are the current goals meeting client's needs? Yes  *If no, list the changes to treatment plan.    Client Input / Response: EMILY    Individual Session Start time:  NA  Individual Session Stop Time:  NA    *Client agrees with any changes to the treatment plan: N/A  *Client received copy of changes: N/A  *Client is aware of right to access a treatment plan review: Yes

## 2021-05-25 ENCOUNTER — HOSPITAL ENCOUNTER (OUTPATIENT)
Dept: BEHAVIORAL HEALTH | Facility: CLINIC | Age: 56
End: 2021-05-25
Attending: FAMILY MEDICINE
Payer: COMMERCIAL

## 2021-05-25 PROCEDURE — H2035 A/D TX PROGRAM, PER HOUR: HCPCS | Mod: HQ,GT

## 2021-05-25 NOTE — GROUP NOTE
"Group Therapy Documentation    PATIENT'S NAME: Lily Caldwell  MRN:   [id number]  :   1965  ACCT. NUMBER: [id number]  DATE OF SERVICE: 21  START TIME:  5:30 PM  END TIME:  7:30 PM  FACILITATOR(S): Chase Wells LADC  TOPIC: BEH Group Therapy  Number of patients attending the group:  7  Group Length:  2 Hours    Group Therapy Type: Life skill(s)    Summary of Group / Topics Discussed:    Choices in Recovery: Client participated in weekly check-in process with group, brief meditation and identified 3 reasons to stay sober.Client discussed their recovery activities and the challenges that can be anticipated in early recovery. Clients nearing completion of Phase 3 shared their 'Recovery Care Plan' Assignment in group. D1 and D5.      Telemedicine Visit: The patient's condition can be safely assessed and treated via synchronous audio and visual telemedicine encounter.      Reason for Telemedicine Visit: Services only offered telehealth    Originating Site (Patient Location): Patient's home    Distant Site (Provider Location): Provider Remote Setting    Consent:  The patient/guardian has verbally consented to: the potential risks and benefits of telemedicine (video visit) versus in person care; bill my insurance or make self-payment for services provided; and responsibility for payment of non-covered services.     Mode of Communication:  Video Conference via [employer]    As the provider I attest to compliance with applicable laws and regulations related to telemedicine.       Group Attendance:  Attended group session    Patient's response to the group topic/interactions:  cooperative with task    Patient appeared to be Engaged.        Client specific details:  Clt checked in sharing that her week has been good and that she was feeling \"goofy good\". She has a recovery goal of wanting her life to be more of what its like now. She shared about the Recovery Path josee she is using and descibed how it works to " her peers who were interested.

## 2021-05-25 NOTE — PROGRESS NOTES
"[facility] Weekly Treatment Plan Review      ATTENDANCE for the following date span:  Monday, 5/24/21 thru Sunday 5/30/21.        Date Tuesday    Group Therapy 2 hours   Individual Therapy    Family Therapy    Other (Specify)        Client attended programming this week with no absences    Weekly Treatment Plan Review       Treatment Plan initiated on: 1/27/21    Dimension1: Acute Intoxication/Withdrawal Potential -   Date of Last Use 1/20/21  Any reports of withdrawal symptoms - No        Dimension 2: Biomedical Conditions & Complications -   Medical Concerns:  NA  Current Medications & Medication Changes:  Current Outpatient Medications   Medication     amitriptyline (ELAVIL) 75 MG tablet     escitalopram (LEXAPRO) 10 MG tablet     hydrOXYzine (ATARAX) 25 MG tablet     Lactobacillus Rhamnosus, GG, ([facility]) capsule     omeprazole (PRILOSEC) 40 MG DR capsule     No current facility-administered medications for this encounter.      Medication Prescriber:  PCP  Taking meds as prescribed? Yes  Medication side effects or concerns:  NA  Outside medical appointments this week (list provider and reason for visit):  NA      Dimension 3: Emotional/Behavioral Conditions & Complications -   Mental health diagnosis NA    Date of last SIB:  NA  Date of  last SI:  NA  Date of last HI: NA  Behavioral Targets:  Reduced symptoms of anxiety and increased impluse control  Current MH Assignments:  Meditation and Journaling    Narrative:  Has impulse control and coping skills. Functions adequately in significant life areas. Clt reporst feeling \"grateful for group peers\". Clt reports exercise.      Dimension 4: Treatment Acceptance / Resistance -   DARIEL Diagnosis:304.30 (F12.20) Cannabis Use Disorder Moderate   Stage - Action  Commitment to tx process/Stage of change- Action  DARIEL assignments -Client to present recovery care plan    Narrative -    Motivated with active reinforcement, to explore Tx and " strategies for change. Client reports making sober connections. Client is in action stage of change.     Dimension 5: Relapse / Continued Problem Potential -   Relapses this week - None  Urges to use - None  UA results -Clt is submitting UA for work     Narrative-    Recognizes relapse issues and prevention strategies. Client has developing coping skills being put to use. Reports looking for additional meetings to attend and developing sober connections. Clt reported thought of using but was able to play the tape forward and practice cognitive diffusion. Client reports exploring  sober support and looking for additional meetings and has found a sponsor. Clt is building substantial recovery structure to support LTR. Clt establishing recovery boundaries with family. Presented 'Is my Anger due to unmet expectations ' assignment in group.       Dimension 6: Recovery Environment -   Family Involvement - Patient's current living/housing situation involves staying in own home/apartment.  She lives with my kids, son and daughter in law and their two children live with me and they report that housing is stable. Patient identified making sober connection and exploring sober support.      Summarize attendance at family groups and family sessions - NA. Clt expressed interest in Family group and was referred.  Family supportive of treatment?  Yes    Community support group attendance - Yes. Clt reports attending AA weekly  Recreational activities - None reported    Narrative -    Engaged in structured, meaningful activity. Reports drug testing for next 2 years for continued employment. Clt attends sober support and is working with a sponsor.       Justification for Continued Treatment at this Level of Care:  Recognizes relapse issues and prevention strategies. Client has developing coping skills being put to use.     Discharge Planning:  Target Discharge Date/Timeframe:  6/7/21   Med Mgmt Provider/Appt:  EMILY   Ind therapy  Provider/Appt:  EMILY   Family therapy Provider/Appt:  EMILY           Dimension Scale Review     Prior ratings: Dim1 - 0 DIM2 - 1 DIM3 - 1 DIM4 - 2 DIM5 - 3 DIM6 -2     Current ratings: Dim1 - 0 DIM2 - 1 DIM3 - 0 DIM4 - 0 DIM5 -0 DIM6 -0       Has vulnerable adult status change? No    Are Treatment Plan goals/objectives effective? Yes  *If no, list changes to treatment plan:    Are the current goals meeting client's needs? Yes  *If no, list the changes to treatment plan.    Client Input / Response: EMILY    Individual Session Start time:  NA  Individual Session Stop Time:  NA    *Client agrees with any changes to the treatment plan: N/A  *Client received copy of changes: N/A  *Client is aware of right to access a treatment plan review: Yes

## 2021-06-01 ENCOUNTER — HOSPITAL ENCOUNTER (OUTPATIENT)
Dept: BEHAVIORAL HEALTH | Facility: CLINIC | Age: 56
End: 2021-06-01
Attending: FAMILY MEDICINE
Payer: COMMERCIAL

## 2021-06-01 PROCEDURE — H2035 A/D TX PROGRAM, PER HOUR: HCPCS | Mod: HQ,95

## 2021-06-01 NOTE — GROUP NOTE
Group Therapy Documentation    PATIENT'S NAME: Lily Caldwell  MRN:   [id number]  :   1965  [id number]  DATE OF SERVICE: 21  START TIME:  5:30 PM  END TIME:  7:30 PM  FACILITATOR(S): Chase Wells LADC  TOPIC: BEH Group Therapy  Number of patients attending the group:  7  Group Length:  2 Hours    Group Therapy Type: Life skill(s)    Summary of Group / Topics Discussed:    Clients checked in and talked about their week since last group and recovery action taken. Clients discussed in group their readiness for long term recovery and relapse prevention identifying what's required and what they needed. Client participated in group discussions on goal planning for phase 3 and the  8 Essentials for Recovery . Clts listened to peers present recovery care plans in group with questions and feedback as follow up. Strong emphasis placed on sober support attendance and connections being made in recovery community. This related to Clients Dim 6 tx plan goal.       Group Attendance:  Attended group session    Patient's response to the group topic/interactions:  cooperative with task    Patient appeared to be Engaged.        Client specific details:  This was clt last group session for phase 3. She presented her Recovery Care Plan in group and responded positively to feedback and questions.Clt Has a solid daily schedule/routine and is building a great sober support network with women in recovery.

## 2021-06-09 NOTE — DISCHARGE SUMMARY
"CHEMICAL DEPENDENCY DISCHARGE SUMMARY       EVALUATION OUNSELOR: CARA Khan, MIRI      TREATMENT COUNSELOR:   Chase Wells MA, [facility]     REFERRAL SOURCE:  [facility]     PROGRAM:  [facility] Adult Substance Use Disorder Intensive Outpatient Program in [facility].     ADMISSION DATE:  21     LAST SESSION DATE: 2021     DISCHARGE DATE:  2021    ADMISSION IMPRESSION:   F12.20) Cannabis Use Disorder Moderate       DISCHARGE IMPRESSION:   F12.20) Cannabis Use Disorder Moderate   REASON FOR DISCHARGE:  Client successfully completed the program.     LAST DATE OF USE:  Client reports last use date as 20      HOURS OF TREATMENT COMPLETED:  This client completed 24 sessions of Phase I, 14 sessions of Phase 2, and 7 sessions of Phase 3, plus service initiation, treatment planning, and individual sessions for a total of 96 hours of treatment.      REASON FOR EVALUATION:   This patient had a Rule 25 Assessment on 21 at [facility] completed by CARA Khan, MIRI.  INSIDE: The reason for seeking services at this time is: \"Had positive UA at work for marijuana. Met with Julia Sena and she recommended outpatient treatment and recommended your program\".       SERVICES PROVIDED:  The services provided included: treatment and discharge planning, psychoeducation, recovery skill building, relapse prevention skills, problem solving, managing conflicts and communication skill building, clarifying values, expressing feelings, social skill building, 12-step facilitation, individual psychotherapy and group therapy.     ISSUES ADDRESSED IN TREATMENT:      DIMENSION 1:  Acute Withdrawal Issues and Detox:    Admit to IOP risk rating 0, discharge risk ratin  Client's goal in this dimension was for client to develop effective strategies to maintain sobriety.  Client successfully met this goal and remained abstinent throughout " treatment.  No concerns in this dimension at time of discharge.      DIMENSION 2:  Biomedical Conditions and Complaints.    Admit to [facility] risk rating 1, discharge risk ratin  Client s goals were to disclose CD status to medical providers and follow up with medical interventions while in [facility], and to maintain good physical health.  Client followed up with medical interventions and disclosed DARIEL diagnosis to providers while in program.  Client displays full functioning with good ability to cope with physical discomfort and is able to get the services she needs.  No concerns in this dimension at time of discharge.     DIMENSION 3:  Emotional, Behavioral, Cognitive Conditions and Complications:    Admit to [facility] risk rating 1, discharge risk ratin  Client's goals in this area were to learn how to apply self-care and psychotherapy to build a repertoire of daily habits that counteract PAWS, fatigue, depression and anxiety leading to increased resiliency and well-being. Further goals were to understand the relationship between mental health concerns and addiction. Client completed 6 of 6 mental health skills groups at [facility].  No SI or other concerns while in [facility] CD.      DIMENSION 4:  Readiness to Change:    Admit to [facility] risk rating 2, discharge risk ratin  Client's goal in this dimension was for client to understand the impact his substance use has had on her, her family, and significant relationships, and to Increase internal motivation to change. Client was effective in meeting these goals.  The client completed all treatment strategies in this dimension, including completion of his  Symptoms  assignment.  Client was able to gain insight into her past behaviors. She identified how she had violated her values and how strain in many relationships were related to his use.  Client expressed 3 reasons she wished to remain sober each week and had regular and punctual attendance in groups except for  excused absences.  She was an active and responsible participant throughout treatment.  Client demonstrates that she is cooperative, motivated, and committed to change.  At treatment end client expressed clear advantages of being sober.  No concerns in this dimension at time of discharge.     DIMENSION 5:  Relapse, Continued Use, Continued Problem Potential:    Admit to IOP risk rating 3, discharge risk ratin  Client's goals in this area were to develop sober coping and living skills in order to address the development of a strategy for long term recovery.  Client was effective in completing all treatment strategies.  Client completed her  Relapse Prevention Plan  and  Recovery Care Plan  assignments.  Client appears to have accepted that she has a chronic disease requiring daily management and to have gained insight into himself and his relapse triggers.  She is using strategies to prevent them from causing relapse.  Client completed a Recovery Care Plan and discussed how she will implement it for successful recovery. At discharge, client reports regular weekly attendance at sober support meetings and to working with a sponsor. No concerns in this dimension at time of discharge.       DIMENSION 6:  Recovery Environment:    Admit to IOP risk rating 2, discharge risk ratin  Client's goal in this dimension was to increase her sober support network.  Clt reports actively working a Recovery program and attending regular sober support meetings. Client is employed and she currently lives in her home with her son and daughter in law. She reports her environment to be safe and supportive of recovery.      STRENGTHS:  Client was cooperative and actively participated throughout treatment.  Client was able to give good insight and feedback to other members of the group.  Client was able to recognize the harmful consequences of continued use.  Client displays a hopeful attitude toward the future.  Client identifies  being hard working and dedicated as personal strengths.      PROGNOSIS:  This client has a favorable prognosis assuming she follows all continuation of care recommendations.      LIVING ARRANGEMENTS AT DISCHARGE:  Currently lives in her home with her son and daughter in law and reports her environment to be safe.      CONTINUATION OF CARE RECOMMENDATIONS:    Client is recommended to abstain from all non-prescribed mood-altering substances.    Client is recommended to manage her mental and physical health with her health care providers and follow recommendations made by them.    Client is recommended to continue to take medications as prescribed and continually follow up with his prescribing healthcare providers as directed to do so.    Client is recommended to attend sober support meetings on a weekly and regular basis.    Client is recommended to obtain and have regular contact with a sponsor or sober mentor.   Client is recommended to maintain open communication with family and friends about sober life.  Client is recommended to maintain contact with supportive people in recovery.  Client is recommended to engage in self-care activities daily.   Client is recommended to remain law abiding.        Chase Wells MA, [facility]     Lily Caldwell   : 1965  MRN: [id number]

## 2021-10-11 ENCOUNTER — HEALTH MAINTENANCE LETTER (OUTPATIENT)
Age: 56
End: 2021-10-11

## 2022-03-27 ENCOUNTER — HEALTH MAINTENANCE LETTER (OUTPATIENT)
Age: 57
End: 2022-03-27

## 2022-04-22 NOTE — PROGRESS NOTES
Service Initiation Individua session.  Video visit     Duration 45 minutes     Data: Client and I discussed SI and Orientation materials.  Client also expressed that she has been trying to control her smoking and her job is on the line.     Intervention: motivational interviewing, person centered approach      Assessment: Client seems to feel hopeful for recovery      Plan: Client will do tx planning session week of 2/1/21.     Client will start group today.  He is going to write down 3 goals she has for self growth and learning about addiction.     Telemedicine Visit: The patient's condition can be safely assessed and treated via synchronous audio and visual telemedicine encounter.       Reason for Telemedicine Visit: Services only offered telehealth     Originating Site (Patient Location): Patient's home     Distant Site (Provider Location): Provider Remote Setting     Consent:  The patient/guardian has verbally consented to: the potential risks and benefits of telemedicine (video visit) versus in person care; bill my insurance or make self-payment for services provided; and responsibility for payment of non-covered services.      Mode of Communication:  Video Conference via [employer]     As the provider I attest to compliance with applicable laws and regulations related to telemedicine.      This was sent to pharmacy after appointment today. Med refused.

## 2022-09-10 NOTE — PROGRESS NOTES
"[facility] Weekly Treatment Plan Review      ATTENDANCE for the following date span:  Monday, 2/2/21thru Thursday, 2/4/21.    Date Monday Tuesday Wednesday Thursday    Group Therapy 0 hours 3 hours 2.0 hours 2.0 hours   Individual Therapy       Family Therapy       Other (Specify)           Patient did not have any absences during this time period (list absence dates and reason for absence).        Weekly Treatment Plan Review    Phase 1: 15 sessions attended through 2/18     Treatment Plan initiated on: 1/27/21    Dimension1: Acute Intoxication/Withdrawal Potential -   Date of Last Use 1/20/21  Any reports of withdrawal symptoms - No        Dimension 2: Biomedical Conditions & Complications -   Medical Concerns:  NA  Current Medications & Medication Changes:  Current Outpatient Medications   Medication     amitriptyline (ELAVIL) 75 MG tablet     escitalopram (LEXAPRO) 10 MG tablet     hydrOXYzine (ATARAX) 25 MG tablet     Lactobacillus Rhamnosus, GG, ([facility]) capsule     omeprazole (PRILOSEC) 40 MG DR capsule     No current facility-administered medications for this encounter.      Medication Prescriber:  PCP  Taking meds as prescribed? Yes  Medication side effects or concerns:  NA  Outside medical appointments this week (list provider and reason for visit):  NA        Dimension 3: Emotional/Behavioral Conditions & Complications -   Mental health diagnosis NA    Date of last SIB:  NA  Date of  last SI:  NA  Date of last HI: NA  Behavioral Targets:    Current  Assignments:      Narrative:     Has impulse control and coping skills. Functions adequately in significant life areas. Clt reporst feeling \"grateful for group peers\".       Dimension 4: Treatment Acceptance / Resistance -   DARIEL Diagnosis:304.30 (F12.20) Cannabis Use Disorder Moderate   Stage - 1  Commitment to tx process/Stage of change- Preparation  DARIEL assignments -NA    Narrative -    Motivated with active reinforcement, " to explore Tx and strategies for change. Client reports making sober connection.     Dimension 5: Relapse / Continued Problem Potential -   Relapses this week - None  Urges to use - None  UA results -Clt is submitting UA weekly for work     Narrative-    Recognizes relapse issues and prevention strategies, but displays some vulnerability for further substance use. Client has developing coping skills being put to use. Reports looking for meetings to attend and developing sober connections. Clt reported thought of using but was able to play the tape forward and practice cognitive diffusion.        Dimension 6: Recovery Environment -   Family Involvement - Patient's current living/housing situation involves staying in own home/apartment.  They live with my kids, son and daughter in law and their two children live with me and they report that housing is stable. Patient identified making sober connection and exploring sober support.      Summarize attendance at family groups and family sessions - NA  Family supportive of treatment?  Yes    Community support group attendance - NA  Recreational activities - None reported    Narrative -    Engaged in structured, meaningful activity,        Justification for Continued Treatment at this Level of Care:  Recognizes relapse issues and prevention strategies, but displays some vulnerability for further substance use. Client has developing coping skills being put to use.     Discharge Planning:  Target Discharge Date/Timeframe:  6/7/21   Med Mgmt Provider/Appt:  EMILY   Ind therapy Provider/Appt:  EMILY   Family therapy Provider/Appt:  EMILY           Dimension Scale Review     Prior ratings: Dim1 - 0 DIM2 - 1 DIM3 - 1 DIM4 - 2 DIM5 - 3 DIM6 -2     Current ratings: Dim1 - 0 DIM2 - 1 DIM3 - 1 DIM4 - 1 DIM5 - 1 DIM6 -2       Has vulnerable adult status change? No    Are Treatment Plan goals/objectives effective? Yes  *If no, list changes to treatment plan:    Are the current goals meeting  client's needs? Yes  *If no, list the changes to treatment plan.    Client Input / Response: NA    Individual Session Start time:  NA  Individual Session Stop Time:  NA    *Client agrees with any changes to the treatment plan: N/A  *Client received copy of changes: N/A  *Client is aware of right to access a treatment plan review: Yes     negative...

## 2022-09-25 ENCOUNTER — HEALTH MAINTENANCE LETTER (OUTPATIENT)
Age: 57
End: 2022-09-25

## 2023-04-17 NOTE — TELEPHONE ENCOUNTER
I reached out to client to discuss family group per her counselors request. I was able to talk to her about the benefits of attending the family program to include increased communication skills, understanding boundaries and understanding the family disease of addiction. We talked about the program days and times and due to her work schedule and being very short staffed she is unable to take time off of work. She stated that she believes the family program would be very benefit but cannot take the time from work right now. I encouraged her to reach out to her counselor or me if things change. She went on to talk about how they hare going to have a shut down towards the end of May and/or early June and she may be able to attend then. I directed her towards to her counselor to finalize dates once work finalizes dates of the shut down. I confirmed we would absolutely be able to accommodate this then.    Postop Diagnosis: same

## 2023-05-08 ENCOUNTER — HEALTH MAINTENANCE LETTER (OUTPATIENT)
Age: 58
End: 2023-05-08

## 2024-05-11 ENCOUNTER — HEALTH MAINTENANCE LETTER (OUTPATIENT)
Age: 59
End: 2024-05-11